# Patient Record
Sex: FEMALE | Race: ASIAN | Employment: OTHER | URBAN - METROPOLITAN AREA
[De-identification: names, ages, dates, MRNs, and addresses within clinical notes are randomized per-mention and may not be internally consistent; named-entity substitution may affect disease eponyms.]

---

## 2020-01-08 ENCOUNTER — HOSPITAL ENCOUNTER (EMERGENCY)
Age: 80
Discharge: HOME | End: 2020-01-08
Attending: EMERGENCY MEDICINE
Payer: MEDICARE

## 2020-01-08 VITALS
WEIGHT: 117 LBS | HEART RATE: 68 BPM | DIASTOLIC BLOOD PRESSURE: 68 MMHG | HEIGHT: 59 IN | BODY MASS INDEX: 23.59 KG/M2 | OXYGEN SATURATION: 98 % | TEMPERATURE: 98.3 F | RESPIRATION RATE: 16 BRPM | SYSTOLIC BLOOD PRESSURE: 141 MMHG

## 2020-01-08 DIAGNOSIS — R42 LIGHTHEADEDNESS: Primary | ICD-10-CM

## 2020-01-08 LAB
ALBUMIN SERPL-MCNC: 4.1 G/DL (ref 3.2–4.6)
ALBUMIN/GLOB SERPL: 1 {RATIO} (ref 1.2–3.5)
ALP SERPL-CCNC: 65 U/L (ref 50–136)
ALT SERPL-CCNC: 25 U/L (ref 12–65)
ANION GAP SERPL CALC-SCNC: 7 MMOL/L (ref 7–16)
AST SERPL-CCNC: 24 U/L (ref 15–37)
ATRIAL RATE: 84 BPM
BASOPHILS # BLD: 0 K/UL (ref 0–0.2)
BASOPHILS NFR BLD: 1 % (ref 0–2)
BILIRUB SERPL-MCNC: 0.3 MG/DL (ref 0.2–1.1)
BUN SERPL-MCNC: 12 MG/DL (ref 8–23)
CALCIUM SERPL-MCNC: 9.4 MG/DL (ref 8.3–10.4)
CALCULATED P AXIS, ECG09: 33 DEGREES
CALCULATED R AXIS, ECG10: -16 DEGREES
CALCULATED T AXIS, ECG11: 34 DEGREES
CHLORIDE SERPL-SCNC: 108 MMOL/L (ref 98–107)
CO2 SERPL-SCNC: 26 MMOL/L (ref 21–32)
CREAT SERPL-MCNC: 0.8 MG/DL (ref 0.6–1)
DIAGNOSIS, 93000: NORMAL
DIFFERENTIAL METHOD BLD: NORMAL
EOSINOPHIL # BLD: 0.1 K/UL (ref 0–0.8)
EOSINOPHIL NFR BLD: 2 % (ref 0.5–7.8)
ERYTHROCYTE [DISTWIDTH] IN BLOOD BY AUTOMATED COUNT: 12.8 % (ref 11.9–14.6)
GLOBULIN SER CALC-MCNC: 4.2 G/DL (ref 2.3–3.5)
GLUCOSE SERPL-MCNC: 102 MG/DL (ref 65–100)
HCT VFR BLD AUTO: 41.1 % (ref 35.8–46.3)
HGB BLD-MCNC: 14.2 G/DL (ref 11.7–15.4)
IMM GRANULOCYTES # BLD AUTO: 0 K/UL (ref 0–0.5)
IMM GRANULOCYTES NFR BLD AUTO: 0 % (ref 0–5)
LYMPHOCYTES # BLD: 3 K/UL (ref 0.5–4.6)
LYMPHOCYTES NFR BLD: 44 % (ref 13–44)
MCH RBC QN AUTO: 31.3 PG (ref 26.1–32.9)
MCHC RBC AUTO-ENTMCNC: 34.5 G/DL (ref 31.4–35)
MCV RBC AUTO: 90.7 FL (ref 79.6–97.8)
MONOCYTES # BLD: 0.3 K/UL (ref 0.1–1.3)
MONOCYTES NFR BLD: 5 % (ref 4–12)
NEUTS SEG # BLD: 3.3 K/UL (ref 1.7–8.2)
NEUTS SEG NFR BLD: 49 % (ref 43–78)
NRBC # BLD: 0 K/UL (ref 0–0.2)
P-R INTERVAL, ECG05: 198 MS
PLATELET # BLD AUTO: 191 K/UL (ref 150–450)
PMV BLD AUTO: 10 FL (ref 9.4–12.3)
POTASSIUM SERPL-SCNC: 3.6 MMOL/L (ref 3.5–5.1)
PROT SERPL-MCNC: 8.3 G/DL (ref 6.3–8.2)
Q-T INTERVAL, ECG07: 404 MS
QRS DURATION, ECG06: 90 MS
QTC CALCULATION (BEZET), ECG08: 477 MS
RBC # BLD AUTO: 4.53 M/UL (ref 4.05–5.2)
SODIUM SERPL-SCNC: 141 MMOL/L (ref 136–145)
TROPONIN I SERPL-MCNC: 0.02 NG/ML (ref 0.02–0.05)
VENTRICULAR RATE, ECG03: 84 BPM
WBC # BLD AUTO: 6.8 K/UL (ref 4.3–11.1)

## 2020-01-08 PROCEDURE — 85025 COMPLETE CBC W/AUTO DIFF WBC: CPT

## 2020-01-08 PROCEDURE — 99285 EMERGENCY DEPT VISIT HI MDM: CPT | Performed by: EMERGENCY MEDICINE

## 2020-01-08 PROCEDURE — 93005 ELECTROCARDIOGRAM TRACING: CPT | Performed by: EMERGENCY MEDICINE

## 2020-01-08 PROCEDURE — 74011250636 HC RX REV CODE- 250/636: Performed by: EMERGENCY MEDICINE

## 2020-01-08 PROCEDURE — 80053 COMPREHEN METABOLIC PANEL: CPT

## 2020-01-08 PROCEDURE — 84484 ASSAY OF TROPONIN QUANT: CPT

## 2020-01-08 RX ORDER — CALC/MAG/B COMPLEX/D3/HERB 61
TABLET ORAL
COMMUNITY

## 2020-01-08 RX ORDER — AMLODIPINE BESYLATE 10 MG/1
TABLET ORAL DAILY
COMMUNITY

## 2020-01-08 RX ADMIN — SODIUM CHLORIDE 1000 ML: 900 INJECTION, SOLUTION INTRAVENOUS at 16:45

## 2020-01-08 NOTE — ED TRIAGE NOTES
Pt states she has been feeling weak since this morning and like she is going to pass out. States she checked BP at [facility] and it was elevated but it is normal in triage. States she has had some nausea but denies vomiting. States she has been smelling some funny smells today also.

## 2020-01-08 NOTE — ED PROVIDER NOTES
Eunice Hernandes is a 67 y.o. female seen on 1/8/2020 at 3:45 PM in the [facility] EMERGENCY DEPT in room ER06/06. Chief Complaint   Patient presents with    Dizziness     HPI:  66 yo female presenting to ER for dizziness. She states her symptoms began at 1:30 pm. She was upstairs and smelled a funny smell and immediately felt lightheaded, like she might pass out. No sweating, no CP, no SOB, no slurred speech, weakness or numbness. She did not have vision change or N/V. She states when she sat down her symptoms resolved completely, but when she stood back up her symptoms returned again. This happened a total of three times. However she notes after triage in the ER, she was able to walk to her room without any symptoms and currently has no symptoms. She denies any  room spinning sensation. No syncope. Historian: patient    REVIEW OF SYSTEMS     Review of Systems   Constitutional: Negative for fever. HENT: Negative. Eyes: Negative. Respiratory: Negative for cough, chest tightness, shortness of breath and wheezing. Cardiovascular: Negative for chest pain. Gastrointestinal: Negative for abdominal distention, abdominal pain, constipation, diarrhea and vomiting. Endocrine: Negative. Genitourinary: Negative for dysuria, flank pain, frequency and urgency. Neurological: Positive for light-headedness. Negative for dizziness, syncope and headaches. Psychiatric/Behavioral: Negative. All other systems reviewed and are negative. PAST MEDICAL HISTORY     Past Medical History:   Diagnosis Date    Gastrointestinal disorder     Hypertension      History reviewed. No pertinent surgical history. Social History     Patient does not qualify to have social determinant information on file (likely too young).    Socioeconomic History    Marital status:      Spouse name: Not on file    Number of children: Not on file    Years of education: Not on file  Highest education level: Not on file     Prior to Admission Medications   Prescriptions Last Dose Informant Patient Reported? Taking? amLODIPine (NORVASC) 10 mg tablet   Yes Yes   Sig: Take  by mouth daily. lansoprazole (PREVACID) 15 mg capsule   Yes Yes   Sig: Take  by mouth Daily (before breakfast). Facility-Administered Medications: None     No Known Allergies     PHYSICAL EXAM       Vitals:    01/08/20 1444   BP: 139/78   Pulse: 84   Resp: 16   Temp: 98 °F (36.7 °C)   SpO2: 94%    Vital signs were reviewed. Physical Exam  Vitals signs reviewed. Constitutional:       General: She is not in acute distress. Appearance: She is well-developed. She is not diaphoretic. HENT:      Head: Normocephalic and atraumatic. Eyes:      General: No visual field deficit. Pupils: Pupils are equal, round, and reactive to light. Neck:      Musculoskeletal: Normal range of motion and neck supple. Cardiovascular:      Rate and Rhythm: Normal rate and regular rhythm. Heart sounds: Normal heart sounds. No murmur. No friction rub. No gallop. Pulmonary:      Effort: Pulmonary effort is normal. No respiratory distress. Breath sounds: Normal breath sounds. No stridor. No wheezing. Abdominal:      General: Bowel sounds are normal. There is no distension. Palpations: Abdomen is soft. There is no mass. Tenderness: There is no tenderness. There is no guarding or rebound. Musculoskeletal: Normal range of motion. General: No tenderness or deformity. Skin:     General: Skin is warm and dry. Findings: No erythema or rash. Neurological:      Mental Status: She is alert and oriented to person, place, and time. GCS: GCS eye subscore is 4. GCS verbal subscore is 5. GCS motor subscore is 6. Cranial Nerves: Cranial nerves are intact. No cranial nerve deficit, dysarthria or facial asymmetry. Sensory: Sensation is intact. No sensory deficit.       Motor: Motor function is intact. No weakness. Coordination: Coordination is intact. Finger-Nose-Finger Test normal.      Gait: Gait is intact. Gait normal.      Comments: No focal neuro deficits   Psychiatric:         Behavior: Behavior normal.          MEDICAL DECISION MAKING     ED Course: Labs are unremarkable, vital signs are stable. Static vital signs are negative. The patient remains asymptomatic. Her neurological examination is reassuring have low suspicion for stroke or other acute neurological cause as a source of her symptoms. I do not think that any neurological or other imaging is indicated at this time. The patient feels comfortable going home. It is possible this could be due to orthostasis or vasovagal changes. She has no nystagmus, no symptoms that appear consistent with vertigo. I have asked him to follow-up with her primary care doctor on Friday for reevaluation. However if her symptoms return she becomes dizzy again she should return to the emergency department immediately    Disposition:  Dc to home  Diagnosis:  lightheadedness  ____________________________________________________________________  A portion of this note was generated using voice recognition dictation software. While the note has been reviewed for accuracy, please note certain words and phrases may not be transcribed as intended and some grammatical and/or typographical errors may be present.

## 2020-01-08 NOTE — ED NOTES
I have reviewed discharge instructions with the patient. The patient verbalized understanding. Patient left ED via Discharge Method: ambulatory to Home with self. Opportunity for questions and clarification provided. Patient given 0 scripts. To continue your aftercare when you leave the hospital, you may receive an automated call from our care team to check in on how you are doing. This is a free service and part of our promise to provide the best care and service to meet your aftercare needs.  If you have questions, or wish to unsubscribe from this service please call [phone]. Thank you for Choosing our [facility] Emergency Department.

## 2022-09-08 LAB
AVERAGE GLUCOSE: NORMAL
HBA1C MFR BLD: 6.1 %

## 2022-10-25 DIAGNOSIS — D69.6 THROMBOCYTOPENIA (HCC): Primary | ICD-10-CM

## 2022-10-26 NOTE — PROGRESS NOTES
New Patient Abstract    Reason for Referral: Thrombocytopenia, unspecified    Referring Provider: Morgan Bingham PA-C     Primary Care Provider: THOMAS Deal NP    Family History of Cancer/Hematologic Disorders: There is no family history of cancer or hematologic disorders documented. Presenting Symptoms: No relevant physical symptoms reported. Narrative with recent with Results/Procedures/Biopsies and Dates completed: Ms. Corinne Bruch is an 21-year-old  female with a history of tobacco use (former smoker), HTN, GERD, arthritis, osteoporosis, non-obstructive CAD, IBS, atrial fibrillation, insomnia, hiatal hernia repair, bladder repair, heart catheterization, hysterectomy, and orthopedic surgery. She was seen on 9/8/22 at [facility] for her annual wellness visit. Routine labs drawn the same day were significant for abs lymphs 3.3 and platelet count of 533. BMP, hepatic panel, iron, and ferritin were all within normal limits. CBC was repeated on 9/14/22 and an accurate platelet count could not be performed due to aggregation of platelets. Review of records indicates that patient's platelet count has been normal over the last several years ranging between 175 and 202. Ms. Corinne Bruch is now referred to [facility], per primary care, for hematology evaluation and treatment of thrombocytopenia.      CBC 9/8/22              CBC 9/14/22                    BMP 9/8/22          HEPATIC PANEL 9/8/22        IRON AND FERRITIN 9/8/22      Notes from Referring Provider: None    Other Pertinent Information:     01/25/2021 (COVID-19, PFIZER PURPLE top, DILUTE for use, (age 15 y+), 30mcg/0.3mL)  02/17/2021 (COVID-19, PFIZER PURPLE top, DILUTE for use, (age 15 y+), 30mcg/0.3mL)    Presented at Tumor Board: N/A

## 2022-10-27 ENCOUNTER — OFFICE VISIT (OUTPATIENT)
Dept: ONCOLOGY | Age: 82
End: 2022-10-27
Payer: MEDICARE

## 2022-10-27 ENCOUNTER — HOSPITAL ENCOUNTER (OUTPATIENT)
Dept: LAB | Age: 82
Discharge: HOME | End: 2022-10-30
Payer: MEDICARE

## 2022-10-27 VITALS
HEIGHT: 58 IN | DIASTOLIC BLOOD PRESSURE: 73 MMHG | HEART RATE: 71 BPM | BODY MASS INDEX: 22.92 KG/M2 | OXYGEN SATURATION: 97 % | SYSTOLIC BLOOD PRESSURE: 147 MMHG | RESPIRATION RATE: 14 BRPM | TEMPERATURE: 97.5 F | WEIGHT: 109.2 LBS

## 2022-10-27 DIAGNOSIS — D69.6 THROMBOCYTOPENIA (HCC): ICD-10-CM

## 2022-10-27 DIAGNOSIS — D69.6 THROMBOCYTOPENIA (HCC): Primary | ICD-10-CM

## 2022-10-27 LAB
ALBUMIN SERPL-MCNC: 4.1 G/DL (ref 3.2–4.6)
ALBUMIN/GLOB SERPL: 1.1 {RATIO} (ref 0.4–1.6)
ALP SERPL-CCNC: 76 U/L (ref 50–136)
ALT SERPL-CCNC: 31 U/L (ref 12–65)
ANION GAP SERPL CALC-SCNC: 6 MMOL/L (ref 2–11)
AST SERPL-CCNC: 27 U/L (ref 15–37)
BASOPHILS # BLD: 0.1 K/UL (ref 0–0.2)
BASOPHILS NFR BLD: 1 % (ref 0–2)
BILIRUB SERPL-MCNC: 0.7 MG/DL (ref 0.2–1.1)
BUN SERPL-MCNC: 16 MG/DL (ref 8–23)
CALCIUM SERPL-MCNC: 9.2 MG/DL (ref 8.3–10.4)
CHLORIDE SERPL-SCNC: 111 MMOL/L (ref 101–110)
CO2 SERPL-SCNC: 24 MMOL/L (ref 21–32)
CREAT SERPL-MCNC: 0.8 MG/DL (ref 0.6–1)
DIFFERENTIAL METHOD BLD: ABNORMAL
EOSINOPHIL # BLD: 0.3 K/UL (ref 0–0.8)
EOSINOPHIL NFR BLD: 4 % (ref 0.5–7.8)
ERYTHROCYTE [DISTWIDTH] IN BLOOD BY AUTOMATED COUNT: 12.7 % (ref 11.9–14.6)
GLOBULIN SER CALC-MCNC: 3.9 G/DL (ref 2.8–4.5)
GLUCOSE SERPL-MCNC: 74 MG/DL (ref 65–100)
HCT VFR BLD AUTO: 40.7 % (ref 35.8–46.3)
HGB BLD-MCNC: 14 G/DL (ref 11.7–15.4)
IMM GRANULOCYTES # BLD AUTO: 0 K/UL (ref 0–0.5)
IMM GRANULOCYTES NFR BLD AUTO: 0 % (ref 0–5)
LYMPHOCYTES # BLD: 3.5 K/UL (ref 0.5–4.6)
LYMPHOCYTES NFR BLD: 51 % (ref 13–44)
MCH RBC QN AUTO: 31.3 PG (ref 26.1–32.9)
MCHC RBC AUTO-ENTMCNC: 34.4 G/DL (ref 31.4–35)
MCV RBC AUTO: 91.1 FL (ref 82–102)
MONOCYTES # BLD: 0.5 K/UL (ref 0.1–1.3)
MONOCYTES NFR BLD: 7 % (ref 4–12)
NEUTS SEG # BLD: 2.5 K/UL (ref 1.7–8.2)
NEUTS SEG NFR BLD: 37 % (ref 43–78)
NRBC # BLD: 0 K/UL (ref 0–0.2)
PLATELET # BLD AUTO: 163 K/UL (ref 150–450)
PLATELET # BLD AUTO: 184 K/UL (ref 150–450)
PMV BLD AUTO: 9.9 FL (ref 9.4–12.3)
POTASSIUM SERPL-SCNC: 3.6 MMOL/L (ref 3.5–5.1)
PROT SERPL-MCNC: 8 G/DL (ref 6.3–8.2)
RBC # BLD AUTO: 4.47 M/UL (ref 4.05–5.2)
SODIUM SERPL-SCNC: 141 MMOL/L (ref 133–143)
WBC # BLD AUTO: 6.8 K/UL (ref 4.3–11.1)

## 2022-10-27 PROCEDURE — 85025 COMPLETE CBC W/AUTO DIFF WBC: CPT

## 2022-10-27 PROCEDURE — 1123F ACP DISCUSS/DSCN MKR DOCD: CPT | Performed by: INTERNAL MEDICINE

## 2022-10-27 PROCEDURE — 85049 AUTOMATED PLATELET COUNT: CPT

## 2022-10-27 PROCEDURE — G8420 CALC BMI NORM PARAMETERS: HCPCS | Performed by: INTERNAL MEDICINE

## 2022-10-27 PROCEDURE — G8427 DOCREV CUR MEDS BY ELIG CLIN: HCPCS | Performed by: INTERNAL MEDICINE

## 2022-10-27 PROCEDURE — 99204 OFFICE O/P NEW MOD 45 MIN: CPT | Performed by: INTERNAL MEDICINE

## 2022-10-27 PROCEDURE — 80053 COMPREHEN METABOLIC PANEL: CPT

## 2022-10-27 PROCEDURE — G8400 PT W/DXA NO RESULTS DOC: HCPCS | Performed by: INTERNAL MEDICINE

## 2022-10-27 PROCEDURE — 1090F PRES/ABSN URINE INCON ASSESS: CPT | Performed by: INTERNAL MEDICINE

## 2022-10-27 PROCEDURE — 1036F TOBACCO NON-USER: CPT | Performed by: INTERNAL MEDICINE

## 2022-10-27 PROCEDURE — 36415 COLL VENOUS BLD VENIPUNCTURE: CPT

## 2022-10-27 PROCEDURE — G8484 FLU IMMUNIZE NO ADMIN: HCPCS | Performed by: INTERNAL MEDICINE

## 2022-10-27 RX ORDER — TRAMADOL HYDROCHLORIDE 50 MG/1
TABLET ORAL
COMMUNITY
Start: 2022-09-09

## 2022-10-27 RX ORDER — AMOXICILLIN 500 MG
CAPSULE ORAL
COMMUNITY

## 2022-10-27 RX ORDER — OXYCODONE HYDROCHLORIDE 5 MG/1
TABLET ORAL
COMMUNITY
Start: 2022-09-09

## 2022-10-27 ASSESSMENT — PATIENT HEALTH QUESTIONNAIRE - PHQ9
SUM OF ALL RESPONSES TO PHQ QUESTIONS 1-9: 0
1. LITTLE INTEREST OR PLEASURE IN DOING THINGS: 0
SUM OF ALL RESPONSES TO PHQ9 QUESTIONS 1 & 2: 0
2. FEELING DOWN, DEPRESSED OR HOPELESS: 0

## 2022-10-27 NOTE — PROGRESS NOTES
[facility]: Office Visit New Patient H/P    Chief Complaint: Thrombocytopenia     History of Present Illness:  Reason for Referral: Thrombocytopenia, unspecified    Referring Provider: Kassy Fletcher PA-C     Primary Care Provider: THOMAS Kang NP    Family History of Cancer/Hematologic Disorders: There is no family history of cancer or hematologic disorders documented. Presenting Symptoms: No relevant physical symptoms reported. Ms. Blanca Fernandez is an 81 y.o.  female with a history of tobacco use (former smoker), HTN, GERD, arthritis, osteoporosis, non-obstructive CAD, IBS, atrial fibrillation, insomnia, hiatal hernia repair, bladder repair, heart catheterization, hysterectomy, and orthopedic surgery. She was seen on 9/8/22 at [facility] for her annual wellness visit. Routine labs drawn the same day were significant for abs lymphs 3.3 and platelet count of 570. BMP, hepatic panel, iron, and ferritin were all within normal limits. CBC was repeated on 9/14/22 and an accurate platelet count could not be performed due to aggregation of platelets. Review of records indicates that patient's platelet count has been normal over the last several years ranging between 175 and 202. Ms. Blanca Fernandez is now referred to [facility], per primary care, for hematology evaluation and treatment of thrombocytopenia. CBC 9/8/22              CBC 9/14/22                    BMP 9/8/22          HEPATIC PANEL 9/8/22        IRON AND FERRITIN 9/8/22      Notes from Referring Provider: None    Other Pertinent Information:     01/25/2021 (COVID-19, PFIZER PURPLE top, DILUTE for use, (age 15 y+), 30mcg/0.3mL)  02/17/2021 (COVID-19, PFIZER PURPLE top, DILUTE for use, (age 15 y+), 30mcg/0.3mL)    Review of Systems:  Constitutional Denies fever or chills. Denies weight loss or appetite changes. Denies fatigue. Denies anorexia.    HEENT Denies trauma, bluring vision, hearing loss, ear pain, nosebleeds, sore throat, neck pain and ear discharge. Skin Denies lesions or rashes. Lungs Denies shortness of breath, cough, sputum production or hemoptysis. Cardiovascular Denies chest pain, palpitations, orthopnea, claudication and leg swelling. Gastrointestinal Denies nausea, vomiting, bowel changes. Denies bloody or black stools. Denies abdominal pain.  Denies dysuria, frequency or hesitancy of urination   Neuro Denies headaches, visual changes or ataxia. Denies dizziness, tingling, tremors, sensory change, speech change, focal weakness and headaches. Hematology Denies nasal/gum bleeding, denies easy bruise   Endo Denies heat/cold intolerance, denies diabetes. MSK Denies back pain, swollen legs, myalgias and falls. Psychiatric/Behavioral Denies depression and substance abuse. The patient is not nervous/anxious. Allergies   Allergen Reactions    Amlodipine Swelling     Leg swelling     Metronidazole Rash     Past Medical History:   Diagnosis Date    Gastrointestinal disorder     Hypertension      History reviewed. No pertinent surgical history. History reviewed. No pertinent family history. Social History     Socioeconomic History    Marital status:       Spouse name: Not on file    Number of children: Not on file    Years of education: Not on file    Highest education level: Not on file   Occupational History    Not on file   Tobacco Use    Smoking status: Never    Smokeless tobacco: Never   Substance and Sexual Activity    Alcohol use: Yes    Drug use: Not on file    Sexual activity: Not on file   Other Topics Concern    Not on file   Social History Narrative    Not on file     Social Determinants of Health     Financial Resource Strain: Not on file   Food Insecurity: Not on file   Transportation Needs: Not on file   Physical Activity: Not on file   Stress: Not on file   Social Connections: Not on file   Intimate Partner Violence: Not on file   Housing Stability: Not on file     Current Outpatient Medications   Medication Sig Dispense Refill    traMADol (ULTRAM) 50 MG tablet       oxyCODONE (ROXICODONE) 5 MG immediate release tablet       Multiple Vitamins-Minerals (WOMENS 50+ ADVANCED PO) Take 1 tablet by mouth every morning      Calcium 200 MG TABS Take 1 tablet by mouth every morning      Alpha-D-Galactosidase (BEANO PO) Take by mouth daily      Doxylamine Succinate, Sleep, (UNISOM PO) Take by mouth at bedtime      Omega-3 Fatty Acids (FISH OIL) 1200 MG CAPS Take by mouth      apixaban (ELIQUIS) 2.5 MG TABS tablet Take 2.5 mg by mouth 2 times daily      aspirin 81 MG EC tablet Take 81 mg by mouth daily      atorvastatin (LIPITOR) 20 MG tablet Take 20 mg by mouth      famotidine (PEPCID) 20 MG tablet TAKE 1 TABLET BY MOUTH EVERY DAY AT BEDTIME AS NEEDED      hydrocortisone 2.5 % cream APPLY TOPICALLY TO THE AFFECTED AREA TWICE DAILY      lansoprazole (PREVACID) 15 MG delayed release capsule Take by mouth every morning (before breakfast)      metoprolol succinate (TOPROL XL) 25 MG extended release tablet Take 25 mg by mouth daily      amLODIPine (NORVASC) 10 MG tablet Take by mouth daily (Patient not taking: Reported on 10/27/2022)      loratadine (CLARITIN) 10 MG tablet Take 10 mg by mouth daily (Patient not taking: Reported on 10/27/2022)       No current facility-administered medications for this visit. OBJECTIVE:  BP (!) 147/73 (Site: Left Upper Arm, Position: Standing, Cuff Size: Medium Adult)   Pulse 71   Temp 97.5 °F (36.4 °C) (Oral)   Resp 14   Ht 4' 10\" (1.473 m)   Wt 109 lb 3.2 oz (49.5 kg)   SpO2 97%   BMI 22.82 kg/m²     Physical Exam:  Constitutional: Oriented to person, place, and time. Well-developed and well-nourished. HEENT: Normocephalic and atraumatic. Oropharynx is clear and moist.   Conjunctivae and EOM are normal. Pupils are equal, round, and reactive to light. No scleral icterus. Neck supple. No JVD present. No tracheal deviation present.  No thyromegaly mmol/L    CO2 24 21 - 32 mmol/L    Anion Gap 6 2 - 11 mmol/L    Glucose 74 65 - 100 mg/dL    BUN 16 8 - 23 MG/DL    Creatinine 0.80 0.6 - 1.0 MG/DL    Est, Glom Filt Rate >60 >60 ml/min/1.73m2    Calcium 9.2 8.3 - 10.4 MG/DL    Total Bilirubin 0.7 0.2 - 1.1 MG/DL    ALT 31 12 - 65 U/L    AST 27 15 - 37 U/L    Alk Phosphatase 76 50 - 136 U/L    Total Protein 8.0 6.3 - 8.2 g/dL    Albumin 4.1 3.2 - 4.6 g/dL    Globulin 3.9 2.8 - 4.5 g/dL    Albumin/Globulin Ratio 1.1 0.4 - 1.6     Platelet Count    Collection Time: 10/27/22  1:49 PM   Result Value Ref Range    Platelets 755 199 - 306 K/uL       Imaging:  No results found for this or any previous visit. ASSESSMENT/PLAN:   Diagnosis Orders   1. Thrombocytopenia ([address]          81 y.o. F consulted for thrombocytopenia presented to [facility] on 10/27/2022. She is doing rather well for her age, relocated to [place] from [place] 3 years ago, regular labs at PCP showed mild thrombocytopenia, presented to [facility] and repeat lab in office showed platelet normalized, discussed that the temporary fluctuation can be due to drug effect, viral infection, lab variation, nonetheless not clinically significant and recommends no further hematological work-up or intervention, continue to follow PCP and return as needed. All questions are answered to their satisfaction. They will call for further questions and concerns. ECOG PERFORMANCE STATUS - 0-Fully active, able to carry on all pre-disease performance without restriction. Pain - 0 - No pain/10. None/Minimal pain - not affecting QOL     Fatigue - No flowsheet data found. Distress - No flowsheet data found. Total time independently spent on today's visit was 45min.  This time included: face-to-face time evaluating the patient as well as additional non-face-to-face time spent on: Preparing to see the patient by obtaining and reviewing previous test results, records and medical history, Performing a medically appropriate history and exam and documenting relevant clinical information for this visit, Counseling and educating patient and family, Ordering tests, Communicating with other health care professionals and Referring patient to another health care provider. Elements of this note have been dictated via voice recognition software. Text and phrases may be limited by the accuracy and autoconversion of the software. The chart has been reviewed, but errors may still be present. Marii Molina M.D.   [address], [address] [place] [address]  Office : [phone]  Fax : [phone]

## 2022-10-27 NOTE — PATIENT INSTRUCTIONS
Patient Instructions from Today's Visit    Reason for Visit:  New Patient    Plan:  - your platelet counts were low, but they are normal today. Follow Up:  - you do not have to follow up with us unless you need us.      Recent Lab Results:  Hospital Outpatient Visit on 10/27/2022   Component Date Value Ref Range Status    WBC 10/27/2022 6.8  4.3 - 11.1 K/uL Final    RBC 10/27/2022 4.47  4.05 - 5.2 M/uL Final    Hemoglobin 10/27/2022 14.0  11.7 - 15.4 g/dL Final    Hematocrit 10/27/2022 40.7  35.8 - 46.3 % Final    MCV 10/27/2022 91.1  82.0 - 102.0 FL Final    MCH 10/27/2022 31.3  26.1 - 32.9 PG Final    MCHC 10/27/2022 34.4  31.4 - 35.0 g/dL Final    RDW 10/27/2022 12.7  11.9 - 14.6 % Final    Platelets 21/59/4993 184  150 - 450 K/uL Final    MPV 10/27/2022 9.9  9.4 - 12.3 FL Final    nRBC 10/27/2022 0.00  0.0 - 0.2 K/uL Final    **Note: Absolute NRBC parameter is now reported with Hemogram**    Seg Neutrophils 10/27/2022 37 (A)  43 - 78 % Final    Lymphocytes 10/27/2022 51 (A)  13 - 44 % Final    Monocytes 10/27/2022 7  4.0 - 12.0 % Final    Eosinophils % 10/27/2022 4  0.5 - 7.8 % Final    Basophils 10/27/2022 1  0.0 - 2.0 % Final    Immature Granulocytes 10/27/2022 0  0.0 - 5.0 % Final    Segs Absolute 10/27/2022 2.5  1.7 - 8.2 K/UL Final    Absolute Lymph # 10/27/2022 3.5  0.5 - 4.6 K/UL Final    Absolute Mono # 10/27/2022 0.5  0.1 - 1.3 K/UL Final    Absolute Eos # 10/27/2022 0.3  0.0 - 0.8 K/UL Final    Basophils Absolute 10/27/2022 0.1  0.0 - 0.2 K/UL Final    Absolute Immature Granulocyte 10/27/2022 0.0  0.0 - 0.5 K/UL Final    Differential Type 10/27/2022 AUTOMATED    Final    Sodium 10/27/2022 141  133 - 143 mmol/L Final    Potassium 10/27/2022 3.6  3.5 - 5.1 mmol/L Final    Chloride 10/27/2022 111 (A)  101 - 110 mmol/L Final    CO2 10/27/2022 24  21 - 32 mmol/L Final    Anion Gap 10/27/2022 6  2 - 11 mmol/L Final    Glucose 10/27/2022 74  65 - 100 mg/dL Final    BUN 10/27/2022 16  8 - 23 MG/DL Final Creatinine 10/27/2022 0.80  0.6 - 1.0 MG/DL Final    EstHermelinda Filt Rate 10/27/2022 >60  >60 ml/min/1.73m2 Final    Comment:      Pediatric calculator link: Evan.at. org/professionals/kdoqi/gfr_calculatorped       Effective Oct 3, 2022       These results are not intended for use in patients <25years of age. eGFR results are calculated without a race factor using  the 2021 CKD-EPI equation. Careful clinical correlation is recommended, particularly when comparing to results calculated using previous equations. The CKD-EPI equation is less accurate in patients with extremes of muscle mass, extra-renal metabolism of creatinine, excessive creatine ingestion, or following therapy that affects renal tubular secretion. Calcium 10/27/2022 9.2  8.3 - 10.4 MG/DL Final    Total Bilirubin 10/27/2022 0.7  0.2 - 1.1 MG/DL Final    ALT 10/27/2022 31  12 - 65 U/L Final    AST 10/27/2022 27  15 - 37 U/L Final    Alk Phosphatase 10/27/2022 76  50 - 136 U/L Final    Total Protein 10/27/2022 8.0  6.3 - 8.2 g/dL Final    Albumin 10/27/2022 4.1  3.2 - 4.6 g/dL Final    Globulin 10/27/2022 3.9  2.8 - 4.5 g/dL Final    Albumin/Globulin Ratio 10/27/2022 1.1  0.4 - 1.6   Final    Platelets 86/20/5039 163  150 - 450 K/uL Final    Performed on Sodium Citrate Tube       Treatment Summary has been discussed and given to patient: n/a        -------------------------------------------------------------------------------------------------------------------  Please call our office at [phone] if you have any  of the following symptoms:   Fever of 100.5 or greater  Chills  Shortness of breath  Swelling or pain in one leg    After office hours an answering service is available and will contact a provider for emergencies or if you are experiencing any of the above symptoms. Patient did express an interest in My Chart.   My Chart log in information explained on the after visit summary printout at the check-out arline.    Abeba Kang MA

## 2025-02-05 ENCOUNTER — APPOINTMENT (OUTPATIENT)
Dept: CT IMAGING | Age: 85
End: 2025-02-05
Payer: MEDICARE

## 2025-02-05 ENCOUNTER — HOSPITAL ENCOUNTER (EMERGENCY)
Age: 85
Discharge: HOME | End: 2025-02-06
Attending: STUDENT IN AN ORGANIZED HEALTH CARE EDUCATION/TRAINING PROGRAM
Payer: MEDICARE

## 2025-02-05 ENCOUNTER — APPOINTMENT (OUTPATIENT)
Dept: GENERAL RADIOLOGY | Age: 85
End: 2025-02-05
Payer: MEDICARE

## 2025-02-05 DIAGNOSIS — K85.10 GALLSTONE PANCREATITIS: Primary | ICD-10-CM

## 2025-02-05 PROBLEM — I50.22 CHRONIC HFREF (HEART FAILURE WITH REDUCED EJECTION FRACTION) (HCC): Status: ACTIVE | Noted: 2021-05-05

## 2025-02-05 PROBLEM — I48.0 PAF (PAROXYSMAL ATRIAL FIBRILLATION) (HCC): Status: ACTIVE | Noted: 2022-05-13

## 2025-02-05 PROBLEM — I10 ESSENTIAL HYPERTENSION: Status: ACTIVE | Noted: 2021-05-04

## 2025-02-05 LAB
ALBUMIN SERPL-MCNC: 4 G/DL (ref 3.2–4.6)
ALBUMIN/GLOB SERPL: 1.1 (ref 1–1.9)
ALP SERPL-CCNC: 72 U/L (ref 35–104)
ALT SERPL-CCNC: 33 U/L (ref 8–45)
ANION GAP SERPL CALC-SCNC: 12 MMOL/L (ref 7–16)
AST SERPL-CCNC: 61 U/L (ref 15–37)
BASOPHILS # BLD: 0.05 K/UL (ref 0–0.2)
BASOPHILS NFR BLD: 0.8 % (ref 0–2)
BILIRUB SERPL-MCNC: 0.6 MG/DL (ref 0–1.2)
BUN SERPL-MCNC: 15 MG/DL (ref 8–23)
CALCIUM SERPL-MCNC: 9.4 MG/DL (ref 8.8–10.2)
CHLORIDE SERPL-SCNC: 104 MMOL/L (ref 98–107)
CO2 SERPL-SCNC: 24 MMOL/L (ref 20–29)
CREAT SERPL-MCNC: 0.8 MG/DL (ref 0.6–1.1)
DIFFERENTIAL METHOD BLD: NORMAL
EOSINOPHIL # BLD: 0.22 K/UL (ref 0–0.8)
EOSINOPHIL NFR BLD: 3.6 % (ref 0.5–7.8)
ERYTHROCYTE [DISTWIDTH] IN BLOOD BY AUTOMATED COUNT: 13.2 % (ref 11.9–14.6)
GLOBULIN SER CALC-MCNC: 3.5 G/DL (ref 2.3–3.5)
GLUCOSE SERPL-MCNC: 113 MG/DL (ref 70–99)
HCT VFR BLD AUTO: 41.4 % (ref 35.8–46.3)
HGB BLD-MCNC: 13.8 G/DL (ref 11.7–15.4)
IMM GRANULOCYTES # BLD AUTO: 0.01 K/UL (ref 0–0.5)
IMM GRANULOCYTES NFR BLD AUTO: 0.2 % (ref 0–5)
LIPASE SERPL-CCNC: 1250 U/L (ref 13–60)
LYMPHOCYTES # BLD: 2.34 K/UL (ref 0.5–4.6)
LYMPHOCYTES NFR BLD: 38.7 % (ref 13–44)
MCH RBC QN AUTO: 31.3 PG (ref 26.1–32.9)
MCHC RBC AUTO-ENTMCNC: 33.3 G/DL (ref 31.4–35)
MCV RBC AUTO: 93.9 FL (ref 82–102)
MONOCYTES # BLD: 0.4 K/UL (ref 0.1–1.3)
MONOCYTES NFR BLD: 6.6 % (ref 4–12)
NEUTS SEG # BLD: 3.02 K/UL (ref 1.7–8.2)
NEUTS SEG NFR BLD: 50.1 % (ref 43–78)
NRBC # BLD: 0 K/UL (ref 0–0.2)
NT PRO BNP: 372 PG/ML (ref 0–450)
PLATELET # BLD AUTO: 176 K/UL (ref 150–450)
PMV BLD AUTO: 10.1 FL (ref 9.4–12.3)
POTASSIUM SERPL-SCNC: 4.1 MMOL/L (ref 3.5–5.1)
PROT SERPL-MCNC: 7.4 G/DL (ref 6.3–8.2)
RBC # BLD AUTO: 4.41 M/UL (ref 4.05–5.2)
SODIUM SERPL-SCNC: 140 MMOL/L (ref 136–145)
TROPONIN T SERPL HS-MCNC: 18 NG/L (ref 0–14)
TROPONIN T SERPL HS-MCNC: 18 NG/L (ref 0–14)
WBC # BLD AUTO: 6 K/UL (ref 4.3–11.1)

## 2025-02-05 PROCEDURE — 85025 COMPLETE CBC W/AUTO DIFF WBC: CPT

## 2025-02-05 PROCEDURE — 2580000003 HC RX 258: Performed by: STUDENT IN AN ORGANIZED HEALTH CARE EDUCATION/TRAINING PROGRAM

## 2025-02-05 PROCEDURE — 96365 THER/PROPH/DIAG IV INF INIT: CPT

## 2025-02-05 PROCEDURE — 93005 ELECTROCARDIOGRAM TRACING: CPT | Performed by: STUDENT IN AN ORGANIZED HEALTH CARE EDUCATION/TRAINING PROGRAM

## 2025-02-05 PROCEDURE — 99285 EMERGENCY DEPT VISIT HI MDM: CPT

## 2025-02-05 PROCEDURE — 80053 COMPREHEN METABOLIC PANEL: CPT

## 2025-02-05 PROCEDURE — 71046 X-RAY EXAM CHEST 2 VIEWS: CPT

## 2025-02-05 PROCEDURE — 6360000004 HC RX CONTRAST MEDICATION: Performed by: STUDENT IN AN ORGANIZED HEALTH CARE EDUCATION/TRAINING PROGRAM

## 2025-02-05 PROCEDURE — 6360000002 HC RX W HCPCS: Performed by: STUDENT IN AN ORGANIZED HEALTH CARE EDUCATION/TRAINING PROGRAM

## 2025-02-05 PROCEDURE — 74177 CT ABD & PELVIS W/CONTRAST: CPT

## 2025-02-05 PROCEDURE — 83690 ASSAY OF LIPASE: CPT

## 2025-02-05 PROCEDURE — 83880 ASSAY OF NATRIURETIC PEPTIDE: CPT

## 2025-02-05 PROCEDURE — 84484 ASSAY OF TROPONIN QUANT: CPT

## 2025-02-05 PROCEDURE — 6370000000 HC RX 637 (ALT 250 FOR IP): Performed by: STUDENT IN AN ORGANIZED HEALTH CARE EDUCATION/TRAINING PROGRAM

## 2025-02-05 RX ORDER — IOPAMIDOL 755 MG/ML
100 INJECTION, SOLUTION INTRAVASCULAR
Status: COMPLETED | OUTPATIENT
Start: 2025-02-05 | End: 2025-02-05

## 2025-02-05 RX ADMIN — NITROGLYCERIN 1 INCH: 20 OINTMENT TOPICAL at 17:15

## 2025-02-05 RX ADMIN — IOPAMIDOL 100 ML: 755 INJECTION, SOLUTION INTRAVENOUS at 20:33

## 2025-02-05 RX ADMIN — PIPERACILLIN AND TAZOBACTAM 4500 MG: 4; .5 INJECTION, POWDER, LYOPHILIZED, FOR SOLUTION INTRAVENOUS at 22:34

## 2025-02-05 ASSESSMENT — ENCOUNTER SYMPTOMS
ABDOMINAL PAIN: 0
VOMITING: 0
EYE DISCHARGE: 0
WHEEZING: 0
SHORTNESS OF BREATH: 1
EYE PAIN: 0
NAUSEA: 0
SORE THROAT: 0

## 2025-02-05 ASSESSMENT — PAIN - FUNCTIONAL ASSESSMENT: PAIN_FUNCTIONAL_ASSESSMENT: 0-10

## 2025-02-05 ASSESSMENT — PAIN SCALES - GENERAL
PAINLEVEL_OUTOF10: 0
PAINLEVEL_OUTOF10: 4

## 2025-02-05 ASSESSMENT — LIFESTYLE VARIABLES
HOW OFTEN DO YOU HAVE A DRINK CONTAINING ALCOHOL: MONTHLY OR LESS
HOW MANY STANDARD DRINKS CONTAINING ALCOHOL DO YOU HAVE ON A TYPICAL DAY: 1 OR 2

## 2025-02-05 NOTE — ED TRIAGE NOTES
Pt w/c to triage with c/o mid chest pain radiating to her back she states he gotten progressively worse in the last two hours with HTN. Pt reports taking 3 ASA prior to arrival. Pt reports she was sent from her PCP.    Subjective:       Patient ID: Roosevelt Alejandro is a 75 y.o. male.    Chief Complaint: Employment Physical    HPI   Mr. Alejandro is a 74 yo male who presents today for physical and follow up.  His medical diagnosis are listed in the problem list below.  Most recent labs reviewed and below. He reports compliance with medications and denies side effects.  His fasting blood glucose is elevated, and has been previously.  His kidney function is also decreased.  He needs physical form completed for his employer.            Lab Results   Component Value Date    WBC 10.10 02/24/2019    HGB 13.2 (L) 02/24/2019    HCT 39.0 (L) 02/24/2019     02/24/2019    CHOL 146 05/28/2019    TRIG 156 (H) 05/28/2019    HDL 36 (L) 05/28/2019    ALT 18 05/28/2019    AST 18 05/28/2019     05/28/2019    K 3.9 05/28/2019     05/28/2019    CREATININE 1.6 (H) 05/28/2019    BUN 25 (H) 05/28/2019    CO2 25 05/28/2019    TSH 1.245 02/24/2019    PSA 28.6 (H) 12/20/2017    INR 1.1 02/24/2019    GLUF 109 07/22/2004    HGBA1C 5.9 01/07/2011           Patient Active Problem List   Diagnosis    Hypertension    Essential hypertension    DDD (degenerative disc disease), lumbar    Chronic allergic rhinitis    ACE inhibitor-aggravated angioedema    Pulmonary asbestosis    H/O arteriovenous malformation (AVM)    Frequent unifocal PVCs    Hx of colonic polyps    Vertigo    Vertigo, aural, unspecified laterality       Review of Systems   Constitutional: Negative for chills, fatigue, fever and unexpected weight change.   HENT: Negative for congestion, hearing loss, nosebleeds, postnasal drip, sinus pressure, sinus pain and sore throat.    Eyes: Negative for pain, discharge, redness and visual disturbance.   Respiratory: Negative for cough, chest tightness and shortness of breath.    Cardiovascular: Negative for chest pain and palpitations.   Gastrointestinal: Negative for abdominal pain, constipation, diarrhea, nausea and vomiting.    Endocrine: Negative for cold intolerance and heat intolerance.   Musculoskeletal: Negative for back pain.   Neurological: Negative for dizziness, syncope, light-headedness and headaches.   Psychiatric/Behavioral: Negative for agitation and dysphoric mood. The patient is not nervous/anxious.        Objective:      Physical Exam   Constitutional: He is oriented to person, place, and time. He appears well-developed and well-nourished.   HENT:   Head: Normocephalic and atraumatic.   Right Ear: Hearing and tympanic membrane normal.   Left Ear: Hearing and tympanic membrane normal.   Nose: Nose normal. Right sinus exhibits no maxillary sinus tenderness and no frontal sinus tenderness. Left sinus exhibits no maxillary sinus tenderness and no frontal sinus tenderness.   Mouth/Throat: Uvula is midline, oropharynx is clear and moist and mucous membranes are normal.   Eyes: Pupils are equal, round, and reactive to light. Conjunctivae are normal. Right eye exhibits no discharge. Left eye exhibits no discharge.   Neck: Normal range of motion. Neck supple. No thyromegaly present.   Cardiovascular: Normal rate, regular rhythm, S1 normal, S2 normal, normal heart sounds and intact distal pulses.   Pulmonary/Chest: Breath sounds normal. No respiratory distress. He has no wheezes.   Abdominal: Soft. Bowel sounds are normal. He exhibits no distension. There is no tenderness. There is no rebound.   Musculoskeletal: Normal range of motion. He exhibits no edema or deformity.   Lymphadenopathy:     He has no cervical adenopathy.   Neurological: He is alert and oriented to person, place, and time. He has normal strength. No cranial nerve deficit or sensory deficit.   Skin: Skin is warm and dry. No rash noted.   Psychiatric: He has a normal mood and affect.       Assessment:       1. Essential hypertension    2. Elevated fasting glucose    3. Abnormal kidney function study        Plan:       Essential hypertension  -     Comprehensive  metabolic panel; Future; Expected date: 06/30/2019        -     Mildly elevated today, 140/78        -    Instructed patient to notify provider if blood pressures are consistently greater than 140/90  Elevated fasting glucose  -     Hemoglobin A1c; Future; Expected date: 05/30/2019        -     Discussed with patient need for above study in regards to elevated fasting blood glucose level        -     Patient to return on another day to complete, due to his need to return to work   Abnormal kidney function study  -Repeat CMP in one month   -Increase fluid intake, and reduce use of NSAIDs      Patient readiness: acceptance and barriers:none    During the course of the visit the patient was educated and counseled about the following:     Hypertension:   Medication: no change.    Goals: Hypertension: Reduce Blood Pressure    Did patient meet goals/outcomes: Yes    The following self management tools provided: declined    Patient Instructions (the written plan) was given to the patient/family.     Time spent with patient: 15 minutes    Barriers to medications present (no )    Adverse reactions to current medications (no)    Over the counter medications reviewed (Yes)

## 2025-02-05 NOTE — ED PROVIDER NOTES
Emergency Department Provider Note       PCP: Arron Carreno APRN - NP   Age: 84 y.o.   Sex: female     DISPOSITION Decision To Transfer 02/05/2025 10:25:32 PM    ICD-10-CM    1. Gallstone pancreatitis  K85.10           Medical Decision Making     84-year-old female presents to the ED for what she describes as chest pain.  Vital signs demonstrate hypertension, otherwise within normal limits.  EKG shows NSR, no ST elevation or lethal dysrhythmia.  High-sensitivity troponin repeat troponin both flat at 18.  Patient does have elevation of lipase at 1250.  Actually does not have significant abdominal pain, very mild epigastric tenderness on repeat examination.  Did obtain a CT of the abdomen/pelvis that shows distended gallbladder with wall thickening and pericholecystic inflammatory stranding concerning for acute cholecystitis, also has dilated CBD at 14 mm in diameter.  This along with the pancreatitis is concerning for gallstone pancreatitis.  Discussed with both GI and general surgery team, they are recommending hospitalist admission downtown and will consult for MRCP and further investigation.  Patient hemodynamically stable at time of transfer.     1 or more acute illnesses that pose a threat to life or bodily function.   Discussion with external consultants.  Shared medical decision making was utilized in creating the patients health plan today.  I independently ordered and reviewed each unique test.    I reviewed external records: ED visit note from a different ED.   I reviewed external records: provider visit note from PCP.     ED cardiac monitoring rhythm strip was ordered and interpreted:  sinus rhythm, no evidence of an arrhythmia  ST Segments:Normal ST segments - NO STEMI   Rate: 81  I interpreted the CT Scan as above.  ED provider's independent EKG interpretation as above  The patient was admitted and I have discussed patient management with the admitting provider.  The management of this patient was  14:54,  Junctional rhythm has replaced Sinus rhythm           CT ABDOMEN PELVIS W IV CONTRAST Additional Contrast? None   Final Result   1. Distended gallbladder with gallbladder wall thickening and pericholecystic   inflammatory stranding concerning for acute cholecystitis. Common bile duct is   dilated measuring 14 mm in diameter. Recommend further evaluation with   gallbladder ultrasound and MRCP.   2. Peripancreatic stranding adjacent to the pancreatic head and neck suggestive   of mild acute pancreatitis.   3. Additional findings as above.               Electronically signed by Hilton ARREDONDO CHEST (2 VW)   Final Result   No acute findings in the chest         Electronically signed by Bud Turner                   No results for input(s): \"COVID19\" in the last 72 hours.     Voice dictation software was used during the making of this note.  This software is not perfect and grammatical and other typographical errors may be present.  This note has not been completely proofread for errors.     Baltazar King MD  02/05/25 [id number]

## 2025-02-06 ENCOUNTER — APPOINTMENT (OUTPATIENT)
Dept: GENERAL RADIOLOGY | Age: 85
End: 2025-02-06
Attending: INTERNAL MEDICINE
Payer: MEDICARE

## 2025-02-06 ENCOUNTER — HOSPITAL ENCOUNTER (INPATIENT)
Age: 85
LOS: 1 days | Discharge: HOME | End: 2025-02-07
Attending: INTERNAL MEDICINE | Admitting: FAMILY MEDICINE
Payer: MEDICARE

## 2025-02-06 ENCOUNTER — ANESTHESIA EVENT (OUTPATIENT)
Dept: ENDOSCOPY | Age: 85
End: 2025-02-06
Payer: MEDICARE

## 2025-02-06 ENCOUNTER — ANESTHESIA (OUTPATIENT)
Dept: ENDOSCOPY | Age: 85
End: 2025-02-06
Payer: MEDICARE

## 2025-02-06 VITALS
WEIGHT: 117 LBS | BODY MASS INDEX: 24.56 KG/M2 | OXYGEN SATURATION: 93 % | HEIGHT: 58 IN | DIASTOLIC BLOOD PRESSURE: 77 MMHG | HEART RATE: 82 BPM | TEMPERATURE: 97.5 F | RESPIRATION RATE: 25 BRPM | SYSTOLIC BLOOD PRESSURE: 116 MMHG

## 2025-02-06 DIAGNOSIS — K83.8 COMMON BILE DUCT DILATATION: ICD-10-CM

## 2025-02-06 PROBLEM — K81.9 CHOLECYSTITIS: Status: ACTIVE | Noted: 2025-02-06

## 2025-02-06 PROBLEM — I51.89 DIASTOLIC DYSFUNCTION: Status: ACTIVE | Noted: 2025-02-06

## 2025-02-06 LAB
ALBUMIN SERPL-MCNC: 3.4 G/DL (ref 3.2–4.6)
ALBUMIN/GLOB SERPL: 1 (ref 1–1.9)
ALP SERPL-CCNC: 83 U/L (ref 35–104)
ALT SERPL-CCNC: 118 U/L (ref 8–45)
ANION GAP SERPL CALC-SCNC: 12 MMOL/L (ref 7–16)
AST SERPL-CCNC: 180 U/L (ref 15–37)
BASOPHILS # BLD: 0.04 K/UL (ref 0–0.2)
BASOPHILS NFR BLD: 0.5 % (ref 0–2)
BILIRUB SERPL-MCNC: 1.2 MG/DL (ref 0–1.2)
BUN SERPL-MCNC: 16 MG/DL (ref 8–23)
CALCIUM SERPL-MCNC: 8.9 MG/DL (ref 8.8–10.2)
CHLORIDE SERPL-SCNC: 107 MMOL/L (ref 98–107)
CO2 SERPL-SCNC: 21 MMOL/L (ref 20–29)
CREAT SERPL-MCNC: 0.81 MG/DL (ref 0.6–1.1)
DIFFERENTIAL METHOD BLD: ABNORMAL
EKG ATRIAL RATE: 357 BPM
EKG DIAGNOSIS: NORMAL
EKG Q-T INTERVAL: 430 MS
EKG QRS DURATION: 86 MS
EKG QTC CALCULATION (BAZETT): 460 MS
EKG R AXIS: -1 DEGREES
EKG T AXIS: 7 DEGREES
EKG VENTRICULAR RATE: 69 BPM
EOSINOPHIL # BLD: 0.05 K/UL (ref 0–0.8)
EOSINOPHIL NFR BLD: 0.6 % (ref 0.5–7.8)
ERYTHROCYTE [DISTWIDTH] IN BLOOD BY AUTOMATED COUNT: 13.2 % (ref 11.9–14.6)
GLOBULIN SER CALC-MCNC: 3.5 G/DL (ref 2.3–3.5)
GLUCOSE SERPL-MCNC: 119 MG/DL (ref 70–99)
HCT VFR BLD AUTO: 38.9 % (ref 35.8–46.3)
HGB BLD-MCNC: 13.5 G/DL (ref 11.7–15.4)
IMM GRANULOCYTES # BLD AUTO: 0.03 K/UL (ref 0–0.5)
IMM GRANULOCYTES NFR BLD AUTO: 0.4 % (ref 0–5)
LYMPHOCYTES # BLD: 1.17 K/UL (ref 0.5–4.6)
LYMPHOCYTES NFR BLD: 13.7 % (ref 13–44)
MCH RBC QN AUTO: 31.3 PG (ref 26.1–32.9)
MCHC RBC AUTO-ENTMCNC: 34.7 G/DL (ref 31.4–35)
MCV RBC AUTO: 90 FL (ref 82–102)
MONOCYTES # BLD: 0.49 K/UL (ref 0.1–1.3)
MONOCYTES NFR BLD: 5.8 % (ref 4–12)
NEUTS SEG # BLD: 6.74 K/UL (ref 1.7–8.2)
NEUTS SEG NFR BLD: 79 % (ref 43–78)
NRBC # BLD: 0 K/UL (ref 0–0.2)
PLATELET # BLD AUTO: 146 K/UL (ref 150–450)
PMV BLD AUTO: 9.9 FL (ref 9.4–12.3)
POTASSIUM SERPL-SCNC: 3.8 MMOL/L (ref 3.5–5.1)
PROT SERPL-MCNC: 6.9 G/DL (ref 6.3–8.2)
RBC # BLD AUTO: 4.32 M/UL (ref 4.05–5.2)
SODIUM SERPL-SCNC: 140 MMOL/L (ref 136–145)
WBC # BLD AUTO: 8.5 K/UL (ref 4.3–11.1)

## 2025-02-06 PROCEDURE — 6370000000 HC RX 637 (ALT 250 FOR IP): Performed by: INTERNAL MEDICINE

## 2025-02-06 PROCEDURE — 74328 X-RAY BILE DUCT ENDOSCOPY: CPT | Performed by: INTERNAL MEDICINE

## 2025-02-06 PROCEDURE — 6370000000 HC RX 637 (ALT 250 FOR IP): Performed by: FAMILY MEDICINE

## 2025-02-06 PROCEDURE — 93010 ELECTROCARDIOGRAM REPORT: CPT | Performed by: INTERNAL MEDICINE

## 2025-02-06 PROCEDURE — 6360000002 HC RX W HCPCS: Performed by: FAMILY MEDICINE

## 2025-02-06 PROCEDURE — 3609015100 HC ERCP STENT PLACEMENT BILIARY/PANCREATIC DUCT: Performed by: INTERNAL MEDICINE

## 2025-02-06 PROCEDURE — C1769 GUIDE WIRE: HCPCS | Performed by: INTERNAL MEDICINE

## 2025-02-06 PROCEDURE — 3700000001 HC ADD 15 MINUTES (ANESTHESIA): Performed by: INTERNAL MEDICINE

## 2025-02-06 PROCEDURE — 0F798DZ DILATION OF COMMON BILE DUCT WITH INTRALUMINAL DEVICE, VIA NATURAL OR ARTIFICIAL OPENING ENDOSCOPIC: ICD-10-PCS | Performed by: INTERNAL MEDICINE

## 2025-02-06 PROCEDURE — 36415 COLL VENOUS BLD VENIPUNCTURE: CPT

## 2025-02-06 PROCEDURE — 6360000002 HC RX W HCPCS: Performed by: ANESTHESIOLOGY

## 2025-02-06 PROCEDURE — 6360000002 HC RX W HCPCS

## 2025-02-06 PROCEDURE — 2500000003 HC RX 250 WO HCPCS: Performed by: FAMILY MEDICINE

## 2025-02-06 PROCEDURE — 80053 COMPREHEN METABOLIC PANEL: CPT

## 2025-02-06 PROCEDURE — 2580000003 HC RX 258: Performed by: INTERNAL MEDICINE

## 2025-02-06 PROCEDURE — 2720000010 HC SURG SUPPLY STERILE: Performed by: INTERNAL MEDICINE

## 2025-02-06 PROCEDURE — 2580000003 HC RX 258

## 2025-02-06 PROCEDURE — 3700000000 HC ANESTHESIA ATTENDED CARE: Performed by: INTERNAL MEDICINE

## 2025-02-06 PROCEDURE — 99222 1ST HOSP IP/OBS MODERATE 55: CPT | Performed by: SURGERY

## 2025-02-06 PROCEDURE — 2580000003 HC RX 258: Performed by: FAMILY MEDICINE

## 2025-02-06 PROCEDURE — C2625 STENT, NON-COR, TEM W/DEL SY: HCPCS | Performed by: INTERNAL MEDICINE

## 2025-02-06 PROCEDURE — 7100000000 HC PACU RECOVERY - FIRST 15 MIN: Performed by: INTERNAL MEDICINE

## 2025-02-06 PROCEDURE — 43274 ERCP DUCT STENT PLACEMENT: CPT | Performed by: INTERNAL MEDICINE

## 2025-02-06 PROCEDURE — 2709999900 HC NON-CHARGEABLE SUPPLY: Performed by: INTERNAL MEDICINE

## 2025-02-06 PROCEDURE — 1100000000 HC RM PRIVATE

## 2025-02-06 PROCEDURE — 85025 COMPLETE CBC W/AUTO DIFF WBC: CPT

## 2025-02-06 PROCEDURE — 7100000001 HC PACU RECOVERY - ADDTL 15 MIN: Performed by: INTERNAL MEDICINE

## 2025-02-06 DEVICE — BILIARY STENT WITH NAVIFLEXTM RX DELIVERY SYSTEM
Type: IMPLANTABLE DEVICE | Site: BILE DUCT | Status: FUNCTIONAL
Brand: ADVANIX™ BILIARY

## 2025-02-06 RX ORDER — SUCCINYLCHOLINE CHLORIDE 20 MG/ML
INJECTION INTRAMUSCULAR; INTRAVENOUS
Status: DISCONTINUED | OUTPATIENT
Start: 2025-02-06 | End: 2025-02-06 | Stop reason: SDUPTHER

## 2025-02-06 RX ORDER — FENTANYL CITRATE 50 UG/ML
INJECTION, SOLUTION INTRAMUSCULAR; INTRAVENOUS
Status: DISCONTINUED | OUTPATIENT
Start: 2025-02-06 | End: 2025-02-06 | Stop reason: SDUPTHER

## 2025-02-06 RX ORDER — ATORVASTATIN CALCIUM 20 MG/1
20 TABLET, FILM COATED ORAL NIGHTLY
Status: DISCONTINUED | OUTPATIENT
Start: 2025-02-06 | End: 2025-02-07 | Stop reason: HOSPADM

## 2025-02-06 RX ORDER — MORPHINE SULFATE 2 MG/ML
2 INJECTION, SOLUTION INTRAMUSCULAR; INTRAVENOUS EVERY 4 HOURS PRN
Status: DISCONTINUED | OUTPATIENT
Start: 2025-02-06 | End: 2025-02-07 | Stop reason: HOSPADM

## 2025-02-06 RX ORDER — MAGNESIUM SULFATE IN WATER 40 MG/ML
2000 INJECTION, SOLUTION INTRAVENOUS PRN
Status: DISCONTINUED | OUTPATIENT
Start: 2025-02-06 | End: 2025-02-07 | Stop reason: HOSPADM

## 2025-02-06 RX ORDER — SODIUM CHLORIDE, SODIUM LACTATE, POTASSIUM CHLORIDE, AND CALCIUM CHLORIDE .6; .31; .03; .02 G/100ML; G/100ML; G/100ML; G/100ML
1000 INJECTION, SOLUTION INTRAVENOUS ONCE
Status: COMPLETED | OUTPATIENT
Start: 2025-02-06 | End: 2025-02-06

## 2025-02-06 RX ORDER — NALOXONE HYDROCHLORIDE 0.4 MG/ML
INJECTION, SOLUTION INTRAMUSCULAR; INTRAVENOUS; SUBCUTANEOUS PRN
Status: DISCONTINUED | OUTPATIENT
Start: 2025-02-06 | End: 2025-02-06 | Stop reason: HOSPADM

## 2025-02-06 RX ORDER — OXYCODONE HYDROCHLORIDE 5 MG/1
10 TABLET ORAL EVERY 4 HOURS PRN
Status: DISCONTINUED | OUTPATIENT
Start: 2025-02-06 | End: 2025-02-07 | Stop reason: HOSPADM

## 2025-02-06 RX ORDER — INDOMETHACIN 100 MG
SUPPOSITORY, RECTAL RECTAL PRN
Status: DISCONTINUED | OUTPATIENT
Start: 2025-02-06 | End: 2025-02-06 | Stop reason: ALTCHOICE

## 2025-02-06 RX ORDER — SODIUM CHLORIDE 0.9 % (FLUSH) 0.9 %
5-40 SYRINGE (ML) INJECTION PRN
Status: DISCONTINUED | OUTPATIENT
Start: 2025-02-06 | End: 2025-02-06 | Stop reason: HOSPADM

## 2025-02-06 RX ORDER — SODIUM CHLORIDE 0.9 % (FLUSH) 0.9 %
5-40 SYRINGE (ML) INJECTION EVERY 12 HOURS SCHEDULED
Status: DISCONTINUED | OUTPATIENT
Start: 2025-02-06 | End: 2025-02-07 | Stop reason: HOSPADM

## 2025-02-06 RX ORDER — POTASSIUM CHLORIDE 7.45 MG/ML
10 INJECTION INTRAVENOUS PRN
Status: DISCONTINUED | OUTPATIENT
Start: 2025-02-06 | End: 2025-02-07 | Stop reason: HOSPADM

## 2025-02-06 RX ORDER — ACETAMINOPHEN 325 MG/1
650 TABLET ORAL EVERY 6 HOURS PRN
Status: DISCONTINUED | OUTPATIENT
Start: 2025-02-06 | End: 2025-02-07 | Stop reason: HOSPADM

## 2025-02-06 RX ORDER — ONDANSETRON 2 MG/ML
4 INJECTION INTRAMUSCULAR; INTRAVENOUS
Status: COMPLETED | OUTPATIENT
Start: 2025-02-06 | End: 2025-02-06

## 2025-02-06 RX ORDER — TRAMADOL HYDROCHLORIDE 50 MG/1
50 TABLET ORAL EVERY 6 HOURS PRN
Status: DISCONTINUED | OUTPATIENT
Start: 2025-02-06 | End: 2025-02-06

## 2025-02-06 RX ORDER — HALOPERIDOL 5 MG/ML
1 INJECTION INTRAMUSCULAR
Status: DISCONTINUED | OUTPATIENT
Start: 2025-02-06 | End: 2025-02-06 | Stop reason: HOSPADM

## 2025-02-06 RX ORDER — SODIUM CHLORIDE, SODIUM LACTATE, POTASSIUM CHLORIDE, CALCIUM CHLORIDE 600; 310; 30; 20 MG/100ML; MG/100ML; MG/100ML; MG/100ML
INJECTION, SOLUTION INTRAVENOUS
Status: DISCONTINUED | OUTPATIENT
Start: 2025-02-06 | End: 2025-02-06 | Stop reason: SDUPTHER

## 2025-02-06 RX ORDER — DEXAMETHASONE SODIUM PHOSPHATE 4 MG/ML
INJECTION, SOLUTION INTRA-ARTICULAR; INTRALESIONAL; INTRAMUSCULAR; INTRAVENOUS; SOFT TISSUE
Status: DISCONTINUED | OUTPATIENT
Start: 2025-02-06 | End: 2025-02-06 | Stop reason: SDUPTHER

## 2025-02-06 RX ORDER — SODIUM CHLORIDE 0.9 % (FLUSH) 0.9 %
5-40 SYRINGE (ML) INJECTION EVERY 12 HOURS SCHEDULED
Status: DISCONTINUED | OUTPATIENT
Start: 2025-02-06 | End: 2025-02-06 | Stop reason: HOSPADM

## 2025-02-06 RX ORDER — CETIRIZINE HYDROCHLORIDE 10 MG/1
10 TABLET ORAL DAILY
Status: DISCONTINUED | OUTPATIENT
Start: 2025-02-06 | End: 2025-02-06

## 2025-02-06 RX ORDER — DEXTROSE MONOHYDRATE 100 MG/ML
INJECTION, SOLUTION INTRAVENOUS CONTINUOUS PRN
Status: DISCONTINUED | OUTPATIENT
Start: 2025-02-06 | End: 2025-02-06 | Stop reason: HOSPADM

## 2025-02-06 RX ORDER — POLYETHYLENE GLYCOL 3350 17 G/17G
17 POWDER, FOR SOLUTION ORAL DAILY PRN
Status: DISCONTINUED | OUTPATIENT
Start: 2025-02-06 | End: 2025-02-07 | Stop reason: HOSPADM

## 2025-02-06 RX ORDER — POTASSIUM CHLORIDE 1500 MG/1
40 TABLET, EXTENDED RELEASE ORAL PRN
Status: DISCONTINUED | OUTPATIENT
Start: 2025-02-06 | End: 2025-02-07 | Stop reason: HOSPADM

## 2025-02-06 RX ORDER — SODIUM CHLORIDE, SODIUM LACTATE, POTASSIUM CHLORIDE, CALCIUM CHLORIDE 600; 310; 30; 20 MG/100ML; MG/100ML; MG/100ML; MG/100ML
INJECTION, SOLUTION INTRAVENOUS CONTINUOUS
Status: DISCONTINUED | OUTPATIENT
Start: 2025-02-06 | End: 2025-02-06 | Stop reason: HOSPADM

## 2025-02-06 RX ORDER — PANTOPRAZOLE SODIUM 40 MG/1
40 TABLET, DELAYED RELEASE ORAL
Status: DISCONTINUED | OUTPATIENT
Start: 2025-02-06 | End: 2025-02-07 | Stop reason: HOSPADM

## 2025-02-06 RX ORDER — ASPIRIN 81 MG/1
81 TABLET ORAL DAILY
Status: DISCONTINUED | OUTPATIENT
Start: 2025-02-06 | End: 2025-02-07 | Stop reason: HOSPADM

## 2025-02-06 RX ORDER — IBUPROFEN 600 MG/1
1 TABLET ORAL PRN
Status: DISCONTINUED | OUTPATIENT
Start: 2025-02-06 | End: 2025-02-06 | Stop reason: HOSPADM

## 2025-02-06 RX ORDER — ONDANSETRON 2 MG/ML
INJECTION INTRAMUSCULAR; INTRAVENOUS
Status: DISCONTINUED | OUTPATIENT
Start: 2025-02-06 | End: 2025-02-06 | Stop reason: SDUPTHER

## 2025-02-06 RX ORDER — PHENYLEPHRINE HYDROCHLORIDE 10 MG/ML
INJECTION INTRAVENOUS
Status: DISCONTINUED | OUTPATIENT
Start: 2025-02-06 | End: 2025-02-06 | Stop reason: SDUPTHER

## 2025-02-06 RX ORDER — ACETAMINOPHEN 650 MG/1
650 SUPPOSITORY RECTAL EVERY 6 HOURS PRN
Status: DISCONTINUED | OUTPATIENT
Start: 2025-02-06 | End: 2025-02-07 | Stop reason: HOSPADM

## 2025-02-06 RX ORDER — PROPOFOL 10 MG/ML
INJECTION, EMULSION INTRAVENOUS
Status: DISCONTINUED | OUTPATIENT
Start: 2025-02-06 | End: 2025-02-06 | Stop reason: SDUPTHER

## 2025-02-06 RX ORDER — SODIUM CHLORIDE 0.9 % (FLUSH) 0.9 %
5-40 SYRINGE (ML) INJECTION PRN
Status: DISCONTINUED | OUTPATIENT
Start: 2025-02-06 | End: 2025-02-07 | Stop reason: HOSPADM

## 2025-02-06 RX ORDER — METOPROLOL SUCCINATE 25 MG/1
25 TABLET, EXTENDED RELEASE ORAL DAILY
Status: DISCONTINUED | OUTPATIENT
Start: 2025-02-06 | End: 2025-02-07 | Stop reason: HOSPADM

## 2025-02-06 RX ORDER — SODIUM CHLORIDE 9 MG/ML
INJECTION, SOLUTION INTRAVENOUS PRN
Status: DISCONTINUED | OUTPATIENT
Start: 2025-02-06 | End: 2025-02-07 | Stop reason: HOSPADM

## 2025-02-06 RX ORDER — OXYCODONE HYDROCHLORIDE 5 MG/1
5 TABLET ORAL
Status: DISCONTINUED | OUTPATIENT
Start: 2025-02-06 | End: 2025-02-06 | Stop reason: HOSPADM

## 2025-02-06 RX ORDER — LIDOCAINE HYDROCHLORIDE 20 MG/ML
INJECTION, SOLUTION EPIDURAL; INFILTRATION; INTRACAUDAL; PERINEURAL
Status: DISCONTINUED | OUTPATIENT
Start: 2025-02-06 | End: 2025-02-06 | Stop reason: SDUPTHER

## 2025-02-06 RX ORDER — SODIUM CHLORIDE 9 MG/ML
INJECTION, SOLUTION INTRAVENOUS PRN
Status: DISCONTINUED | OUTPATIENT
Start: 2025-02-06 | End: 2025-02-06 | Stop reason: HOSPADM

## 2025-02-06 RX ORDER — ONDANSETRON 2 MG/ML
4 INJECTION INTRAMUSCULAR; INTRAVENOUS EVERY 6 HOURS PRN
Status: DISCONTINUED | OUTPATIENT
Start: 2025-02-06 | End: 2025-02-07 | Stop reason: HOSPADM

## 2025-02-06 RX ORDER — IBUPROFEN 600 MG/1
TABLET ORAL
Status: DISCONTINUED | OUTPATIENT
Start: 2025-02-06 | End: 2025-02-06 | Stop reason: SDUPTHER

## 2025-02-06 RX ORDER — ONDANSETRON 4 MG/1
4 TABLET, ORALLY DISINTEGRATING ORAL EVERY 8 HOURS PRN
Status: DISCONTINUED | OUTPATIENT
Start: 2025-02-06 | End: 2025-02-07 | Stop reason: HOSPADM

## 2025-02-06 RX ADMIN — METOPROLOL SUCCINATE 25 MG: 25 TABLET, EXTENDED RELEASE ORAL at 08:33

## 2025-02-06 RX ADMIN — PROPOFOL 50 MG: 10 INJECTION, EMULSION INTRAVENOUS at 14:30

## 2025-02-06 RX ADMIN — ATORVASTATIN CALCIUM 20 MG: 20 TABLET, FILM COATED ORAL at 21:56

## 2025-02-06 RX ADMIN — GLUCAGON 1 EACH: KIT at 14:35

## 2025-02-06 RX ADMIN — LIDOCAINE HYDROCHLORIDE 60 MG: 20 INJECTION, SOLUTION EPIDURAL; INFILTRATION; INTRACAUDAL; PERINEURAL at 14:13

## 2025-02-06 RX ADMIN — PHENYLEPHRINE HYDROCHLORIDE 200 MCG: 10 INJECTION INTRAVENOUS at 14:41

## 2025-02-06 RX ADMIN — PROPOFOL 130 MG: 10 INJECTION, EMULSION INTRAVENOUS at 14:13

## 2025-02-06 RX ADMIN — Medication 140 MG: at 14:13

## 2025-02-06 RX ADMIN — ASPIRIN 81 MG: 81 TABLET, COATED ORAL at 08:32

## 2025-02-06 RX ADMIN — SODIUM CHLORIDE, POTASSIUM CHLORIDE, SODIUM LACTATE AND CALCIUM CHLORIDE 1000 ML: 600; 310; 30; 20 INJECTION, SOLUTION INTRAVENOUS at 15:37

## 2025-02-06 RX ADMIN — SODIUM CHLORIDE, PRESERVATIVE FREE 10 ML: 5 INJECTION INTRAVENOUS at 08:31

## 2025-02-06 RX ADMIN — PANTOPRAZOLE SODIUM 40 MG: 40 TABLET, DELAYED RELEASE ORAL at 05:30

## 2025-02-06 RX ADMIN — DEXAMETHASONE SODIUM PHOSPHATE 4 MG: 4 INJECTION INTRA-ARTICULAR; INTRALESIONAL; INTRAMUSCULAR; INTRAVENOUS; SOFT TISSUE at 14:23

## 2025-02-06 RX ADMIN — ONDANSETRON 4 MG: 2 INJECTION, SOLUTION INTRAMUSCULAR; INTRAVENOUS at 14:23

## 2025-02-06 RX ADMIN — SODIUM CHLORIDE, SODIUM LACTATE, POTASSIUM CHLORIDE, AND CALCIUM CHLORIDE: 600; 310; 30; 20 INJECTION, SOLUTION INTRAVENOUS at 14:08

## 2025-02-06 RX ADMIN — PIPERACILLIN AND TAZOBACTAM 3375 MG: 3; .375 INJECTION, POWDER, LYOPHILIZED, FOR SOLUTION INTRAVENOUS at 05:33

## 2025-02-06 RX ADMIN — SODIUM CHLORIDE, PRESERVATIVE FREE 10 ML: 5 INJECTION INTRAVENOUS at 21:54

## 2025-02-06 RX ADMIN — ONDANSETRON 4 MG: 2 INJECTION, SOLUTION INTRAMUSCULAR; INTRAVENOUS at 15:16

## 2025-02-06 RX ADMIN — PIPERACILLIN AND TAZOBACTAM 3375 MG: 3; .375 INJECTION, POWDER, LYOPHILIZED, FOR SOLUTION INTRAVENOUS at 21:59

## 2025-02-06 RX ADMIN — SODIUM CHLORIDE, SODIUM LACTATE, POTASSIUM CHLORIDE, AND CALCIUM CHLORIDE: 600; 310; 30; 20 INJECTION, SOLUTION INTRAVENOUS at 15:03

## 2025-02-06 RX ADMIN — PHENYLEPHRINE HYDROCHLORIDE 100 MCG: 10 INJECTION INTRAVENOUS at 14:50

## 2025-02-06 RX ADMIN — FENTANYL CITRATE 25 MCG: 50 INJECTION, SOLUTION INTRAMUSCULAR; INTRAVENOUS at 14:50

## 2025-02-06 ASSESSMENT — ENCOUNTER SYMPTOMS
VOMITING: 0
NAUSEA: 0
ABDOMINAL PAIN: 0
DIARRHEA: 0

## 2025-02-06 ASSESSMENT — PAIN SCALES - GENERAL
PAINLEVEL_OUTOF10: 0

## 2025-02-06 ASSESSMENT — PAIN - FUNCTIONAL ASSESSMENT: PAIN_FUNCTIONAL_ASSESSMENT: 0-10

## 2025-02-06 NOTE — CONSULTS
H&P/Consult Note/Progress Note/Office Note:   Kortney Ruggiero  MRN: [id number]  :1940  Age:84 y.o.    HPI: Kortney Ruggiero is a 84 y.o. female who we are asked by IM to see for Gallstone Pancreatitis. The patient has a PMHx of HTN, CHF, PAF on Eliquis (has not taken since before yesterday). She presented with chest pain that was worked up with Cardiac etiology ruled out. She had been having chest pain for the past week. Of note, her son passed away last week and /services are scheduled for tomorrow.      Admission Lipase 1250 without elevation of T bili, mild trasaminitis and CT abdomen demonstrating: distended gallbladder with gallbladder wall thickening and pericholecystic  inflammatory stranding concerning for acute cholecystitis. Common bile duct is dilated measuring 14 mm in diameter. She is on the schedule for ERCP today with Dr Lynn.  She was admitted on 2025 for Gallstone pancreatitis.     This morning her abdominal pain has resolved. No nausea or vomiting. She is afebrile and hemodynamically stable. Interesting, the patient and her daughter state that her epigastric/chest pain resolved with placement of nitropaste.      Blood thinners: ELIQUIS (uncertain last dose)  Abdominal Surgery Hx: Hysterectomy        Past Medical History:   Diagnosis Date    Gastrointestinal disorder     Hypertension      No past surgical history on file.  Current Facility-Administered Medications   Medication Dose Route Frequency    apixaban (ELIQUIS) tablet 2.5 mg  2.5 mg Oral BID    aspirin EC tablet 81 mg  81 mg Oral Daily    atorvastatin (LIPITOR) tablet 20 mg  20 mg Oral Nightly    metoprolol succinate (TOPROL XL) extended release tablet 25 mg  25 mg Oral Daily    oxyCODONE (ROXICODONE) immediate release tablet 10 mg  10 mg Oral Q4H PRN    pantoprazole (PROTONIX) tablet 40 mg  40 mg Oral QAM AC    sodium chloride flush 0.9 % injection 5-40 mL  5-40 mL IntraVENous 2 times per day    sodium chloride flush 0.9 %

## 2025-02-06 NOTE — ANESTHESIA POSTPROCEDURE EVALUATION
Department of Anesthesiology  Postprocedure Note    Patient: Kortney Ruggiero  MRN: [id number]  YOB: 1940  Date of evaluation: 2/6/2025    Procedure Summary       Date: 02/06/25 Room / Location: [facility] ENDO FLOURO 1 / [facility] ENDOSCOPY    Anesthesia Start: 1408 Anesthesia Stop: 1511    Procedure: ENDOSCOPIC RETROGRADE CHOLANGIOPANCREATOGRAPHY, balloon sweep, stent placement (Upper GI Region) Diagnosis:       Common bile duct dilatation      (Common bile duct dilatation [K83.8])    Surgeons: Karina Anthony MD Responsible Provider: Haily Bah MD    Anesthesia Type: General ASA Status: 3 - Emergent            Anesthesia Type: General    Nohelia Phase I: Nohelia Score: 10    Nohelia Phase II:      Anesthesia Post Evaluation    Patient location during evaluation: PACU  Patient participation: complete - patient participated  Level of consciousness: awake and alert  Airway patency: patent  Nausea: well controlled.  Cardiovascular status: acceptable.  Respiratory status: acceptable  Hydration status: stable  Pain management: adequate    No notable events documented.

## 2025-02-06 NOTE — ED NOTES
TRANSFER - OUT REPORT:    Verbal report given to Jonh GALVAN on Kortney Ruggiero  being transferred to [facility]  for routine progression of patient care       Report consisted of patient's Situation, Background, Assessment and   Recommendations(SBAR).     Information from the following report(s) Nurse Handoff Report was reviewed with the receiving nurse.    Lines:   Peripheral IV 02/05/25 Left Antecubital (Active)   Site Assessment Clean, dry & intact 02/05/25 1628   Line Status Blood return noted;Normal saline locked;Capped;Flushed;Specimen collected 02/05/25 1628   Phlebitis Assessment No symptoms 02/05/25 1628   Infiltration Assessment 0 02/05/25 1628   Dressing Status New dressing applied;Clean, dry & intact 02/05/25 1628   Dressing Type Transparent 02/05/25 1628   Dressing Intervention New 02/05/25 1628        Opportunity for questions and clarification was provided.      Patient transported with:  [employer]

## 2025-02-06 NOTE — PLAN OF CARE
Problem: Discharge Planning  Goal: Discharge to home or other facility with appropriate resources  2/6/2025 0856 by Tatum Kincaid, RN  Outcome: Progressing  Flowsheets (Taken 2/6/2025 0833)  Discharge to home or other facility with appropriate resources: Identify barriers to discharge with patient and caregiver  2/6/2025 035 by Sergio Chavez, RN  Outcome: Progressing     Problem: Safety - Adult  Goal: Free from fall injury  Outcome: Progressing     Problem: ABCDS Injury Assessment  Goal: Absence of physical injury  Outcome: Progressing

## 2025-02-06 NOTE — PROGRESS NOTES
4 Eyes Skin Assessment     NAME:  Kortney Ruggiero  YOB: 1940  MEDICAL RECORD NUMBER:  [id number]    The patient is being assessed for  Admission    I agree that at least one RN has performed a thorough Head to Toe Skin Assessment on the patient. ALL assessment sites listed below have been assessed.      Areas assessed by both nurses:    Head, Face, Ears, Shoulders, Back, Chest, Arms, Elbows, Hands, Sacrum. Buttock, Coccyx, Ischium, and Legs. Feet and Heels        Does the Patient have a Wound? No noted wound(s)       Celio Prevention initiated by RN: Yes  Wound Care Orders initiated by RN: No    Pressure Injury (Stage 3,4, Unstageable, DTI, NWPT, and Complex wounds) if present, place Wound referral order by RN under : No    New Ostomies, if present place, Ostomy referral order under : No     Nurse 1 eSignature: Electronically signed by Sergio Chavez RN on 2/6/25 at 3:56 AM EST    **SHARE this note so that the co-signing nurse can place an eSignature**    Nurse 2 eSignature: {Esignature:[id number]}

## 2025-02-06 NOTE — ASSESSMENT & PLAN NOTE
- CT remarks on distended gallbladder with wall thickening and pericholecystic stranding  - General Surgery consulted  - IV zosyn for coverage

## 2025-02-06 NOTE — ANESTHESIA PRE PROCEDURE
Department of Anesthesiology  Preprocedure Note       Name:  Kortney Ruggiero   Age:  84 y.o.  :  1940                                          MRN:  [id number]         Date:  2025      Surgeon: Surgeon(s):  Karina Anthony MD    Procedure: Procedure(s):  ENDOSCOPIC RETROGRADE CHOLANGIOPANCREATOGRAPHY    Medications prior to admission:   Prior to Admission medications    Medication Sig Start Date End Date Taking? Authorizing Provider   melatonin 3 MG TABS tablet Take 10 mg by mouth at bedtime   Yes Lay Santiago MD   Multiple Vitamins-Minerals (WOMENS 50+ ADVANCED PO) Take 1 tablet by mouth every morning   Yes ProviderLay MD   Calcium 200 MG TABS Take 1 tablet by mouth every morning   Yes Lay Santiago MD   Alpha-D-Galactosidase (BEANO PO) Take by mouth daily   Yes Lay Santiago MD   apixaban (ELIQUIS) 2.5 MG TABS tablet Take 1 tablet by mouth 2 times daily 21  Yes Automatic Reconciliation, Ar   aspirin 81 MG EC tablet Take 1 tablet by mouth daily 21  Yes Automatic Reconciliation, Ar   atorvastatin (LIPITOR) 20 MG tablet Take 1 tablet by mouth 21  Yes Automatic Reconciliation, Ar   famotidine (PEPCID) 20 MG tablet TAKE 1 TABLET BY MOUTH EVERY DAY AT BEDTIME AS NEEDED 21  Yes Automatic Reconciliation, Ar   lansoprazole (PREVACID) 15 MG delayed release capsule Take by mouth every morning (before breakfast)   Yes Automatic Reconciliation, Ar   loratadine (CLARITIN) 10 MG tablet Take 1 tablet by mouth daily 21  Yes Automatic Reconciliation, Ar   metoprolol succinate (TOPROL XL) 25 MG extended release tablet Take 1 tablet by mouth daily 21  Yes Automatic Reconciliation, Ar   traMADol (ULTRAM) 50 MG tablet  22   Lay Santiago MD   oxyCODONE (ROXICODONE) 5 MG immediate release tablet  22   Lay Santiago MD   Doxylamine Succinate, Sleep, (UNISOM PO) Take by mouth at bedtime  Patient not taking: Reported on 2025    Pamela

## 2025-02-06 NOTE — H&P
Hospitalist History and Physical   Admit Date:  2025  3:28 AM   Name:  Kortney Ruggiero   Age:  84 y.o.  Sex:  female  :  1940   MRN:  [id number]     Presenting Complaint: chest pain  Reason(s) for Admission: Gallstone pancreatitis [K85.10]     History of Present Illness:   Kortney Ruggiero is a 84 y.o. female with medical history of HTN, CHF, PAF who presented to the [facility] ED with chest pain.  Reports that it started earlier today and has worsened.  Pain radiates to her back.  Upon ER evaluation, troponins are stable x2.  EKG is also unremarkable.  However, lipase is 1,250.  WBC is 6.0.  CT A/P shows:  IMPRESSION:  1. Distended gallbladder with gallbladder wall thickening and pericholecystic  inflammatory stranding concerning for acute cholecystitis. Common bile duct is dilated measuring 14 mm in diameter. Recommend further evaluation with  gallbladder ultrasound and MRCP.  2. Peripancreatic stranding adjacent to the pancreatic head and neck suggestive of mild acute pancreatitis.  3. Additional findings as above.  GI and General Surgery consulted.  Recommended admission to [facility] with MRCP.  Hospitalist asked to admit.    Review of Systems:  10 systems reviewed and negative except as noted in HPI.  Assessment & Plan:   * Gallstone pancreatitis  Assessment & Plan  - Concern for gallstone pancreatitis  - NPO except ice chips and sips with meds  - GI and General Surgery consulted  - MRCP ordered  - PRN antiemetics and pain meds    Cholecystitis  Assessment & Plan  - CT remarks on distended gallbladder with wall thickening and pericholecystic stranding  - General Surgery consulted  - IV zosyn for coverage    PAF (paroxysmal atrial fibrillation) ([facility])  Assessment & Plan  - Rate-controlled  - Continue home meds    Essential hypertension  Assessment & Plan  - BP stable  - Continue home meds    Chronic HFrEF (heart failure with reduced ejection fraction) ([facility])  Assessment & Plan  - Not in exacerbation  - Continue

## 2025-02-06 NOTE — PERIOP NOTE
TRANSFER - OUT REPORT:    Verbal report given to MANDY Muniz on Kortney Ruggiero  being transferred to [facility] for routine post-op       Report consisted of patient’s Situation, Background, Assessment and   Recommendations(SBAR).     Information from the following report(s) Nurse Handoff Report, Adult Overview, Surgery Report, MAR, Cardiac Rhythm NSR with PACs, Quality Measures, and Neuro Assessment was reviewed with the receiving nurse.    Lines:   Peripheral IV 02/05/25 Left Antecubital (Active)   Site Assessment Clean, dry & intact 02/06/25 1510   Line Status Flushed;Infusing 02/06/25 1510   Line Care Connections checked and tightened;Cap changed 02/06/25 1201   Phlebitis Assessment No symptoms 02/06/25 1510   Infiltration Assessment 0 02/06/25 1510   Alcohol Cap Used Yes 02/06/25 1201   Dressing Status Clean, dry & intact 02/06/25 1510   Dressing Type Transparent 02/06/25 1510   Dressing Intervention New 02/05/25 1628        Opportunity for questions and clarification was provided.      Patient transported with:   [employer]    VTE prophylaxis orders have been written for Kortney Ruggiero.    Patient and family given floor number and nurses name.  Family updated re: pt status after security code verified.

## 2025-02-06 NOTE — SIGNIFICANT EVENT
Daughter, Lolita (Dell), reports that she is patient's POA and primary caretaker/\"\" as patient does speak and understands English seemingly well, however Upper sorbian is her primary language, and daughter worries that she may only really understand ~\"40%\" of what we are discussing.    If decisions need to be made for surgery (or anything requiring consent), daughter would like to be contacted if she is not at bedside.  Phone # is [phone].    Daughter plans on returning to hospital in AM to be present.  Requests to spend the night for ease of communication/clarification and care.

## 2025-02-06 NOTE — PROGRESS NOTES
Hospitalist Progress Note   Admit Date:  2025  3:28 AM   Name:  Kortney Ruggiero   Age:  84 y.o.  Sex:  female  :  1940   MRN:  [id number]   Room:      Presenting/Chief Complaint: No chief complaint on file.     Reason(s) for Admission: Gallstone pancreatitis [K85.10]     Hospital Course:   Patient is an 83 y/o female with medical history of hypertension, diastolic dysfunction, paroxysmal atrial fibrillation on apixaban who presented to [facility] ED with cc chest pain that began earlier in the day and has worsened.  Pain radiates to her back.  Upon ER evaluation, troponins are stable x2.  EKG is also unremarkable.  However, lipase is 1,250.  WBC is 6.0.  CT A/P shows distended gallbladder with gallbladder wall thickening and pericholecystic stranding with concern for acute cholecystitis and common bile duct dilation, also acute pancreatitis. No evidence of sepsis. Patient was transferred to [facility] for Hospitalist admission. GI consulted with plans for ERCP today. General surgery following for likely cholecystectomy but may be outpatient.    Subjective & 24hr Events:   NPO for ERCP this afternoon.  Unfortunately her son's  is tomorrow evening so ideally will discharge home tomorrow morning. Surgery reportedly feels they could do cholecystectomy outpatient in quick turn around time to facilitate the  needs.  Await ERCP.  Continue to trend labs.  Alert, oriented, conversational. Family at bedside.    Assessment & Plan:     Gallstone pancreatitis  Elevated LFT's  ERCP this afternoon with GI  PRN analgesia and antiemetics  Daily CMP  Trend lipase  NPO with IVF    Cholecystitis  No evidence of sepsis  Afebrile, no leukocytosis  General surgery on board, Zosyn for antibiotic coverage, may move outpatient due to family needs    Diastolic dysfunction  TTE  ([facility]): low normal systolic function, grade 1 LV DD  Volume status stable  Continue metoprolol    Essential

## 2025-02-06 NOTE — PROGRESS NOTES
TRANSFER - OUT REPORT:    Verbal report given to Susan Maxwellop RN on Kortney Ruggiero  being transferred to [facility] for ordered procedure       Report consisted of patient's Situation, Background, Assessment and   Recommendations(SBAR).     Information from the following report(s) Nurse Handoff Report was reviewed with the receiving nurse.           Lines:   Peripheral IV 02/05/25 Left Antecubital (Active)   Site Assessment Clean, dry & intact 02/06/25 0858   Line Status Flushed;Normal saline locked 02/06/25 0858   Line Care Connections checked and tightened;Cap changed 02/06/25 0858   Phlebitis Assessment No symptoms 02/06/25 0858   Infiltration Assessment 0 02/06/25 0858   Alcohol Cap Used Yes 02/06/25 0858   Dressing Status Clean, dry & intact 02/06/25 0858   Dressing Type Transparent 02/06/25 0858   Dressing Intervention New 02/05/25 1628        Opportunity for questions and clarification was provided.      Patient transported with:

## 2025-02-06 NOTE — ASSESSMENT & PLAN NOTE
- Concern for gallstone pancreatitis  - NPO except ice chips and sips with meds  - GI and General Surgery consulted  - MRCP ordered  - PRN antiemetics and pain meds

## 2025-02-06 NOTE — CONSULTS
Consult Note            Date:2/6/2025        Patient Name:Kortney Ruggiero     YOB: 1940     Age:84 y.o.    Inpatient consult to GI  Consult performed by: Bindu Cesar APRN - CNP  Consult ordered by: Kristin Hoffmann MD          Chief Complaint   No chief complaint on file.       History Obtained From   patient, family member - daughter    History of Present Illness   Kortney Ruggiero is an 85 yo female who presented to the ED with chest pain. PMH includes HTN, CHF, PAF. GI was consulted for gallstone pancreatitis. Patient's daughter (DELMER) Lolita, states she began experiencing chest pain yesterday that was very intense they brought her to the ED. She was also having high BP which the daughter states she has not taken her losartan in a few months. She denies nausea, vomiting or abdominal pain. She denies history of liver disease. She denies any history of gallbladder issues or pancreatitis. She denies any new medications. She denies alcohol or tobacco use. She is on Eliquis however she has ran out so her last dose was Tuesday. She denies NSAID use. CT AP showed concerns for acute cholecystitis as well as CBD dilated to 14mm and acute pancreatitis. LFT's elevated.     Labs: Bili 1.2, , , Alk Phos 83, Lipase 1250    Medications: Eliquis, Aspirin, PPI     Imaging: CT ABDOMEN PELVIS W IV CONTRAST Additional Contrast? None    Result Date: 2/5/2025  1. Distended gallbladder with gallbladder wall thickening and pericholecystic inflammatory stranding concerning for acute cholecystitis. Common bile duct is dilated measuring 14 mm in diameter. Recommend further evaluation with gallbladder ultrasound and MRCP. 2. Peripancreatic stranding adjacent to the pancreatic head and neck suggestive of mild acute pancreatitis. 3. Additional findings as above.    Past Medical History     Past Medical History:   Diagnosis Date    Gastrointestinal disorder     Hypertension         Past Surgical History   No past  presented with chest pain that began yesterday. CT AP showed concerns for acute cholecystitis as well as a CBD dilated to 14mm and acute pancreatitis. Discussed case with MD and will plan for ERCP today. NPO for procedure.     Patient's son passed away last week and the services are tomorrow at 7pm and she would like to go if this is at all possible.     Electronically signed by THOMAS Richmond CNP on 2/6/25 at 8:24 AM EST

## 2025-02-06 NOTE — PROGRESS NOTES
Database has been completed with the exception of home med list. Patient unable to verify. Per patient, her daughter will bring a list/meds in the morning. Please complete.

## 2025-02-06 NOTE — PERIOP NOTE
TRANSFER - IN REPORT:    Verbal report received from MANDY Muniz on Kortney Ruggiero  being received from Room 221 for ordered procedure      Report consisted of patient's Situation, Background, Assessment and   Recommendations(SBAR).     Information from the following report(s) Nurse Handoff Report, Index, ED Encounter Summary, ED SBAR, Adult Overview, Intake/Output, MAR, Pre Procedure Checklist, Procedure Verification, Quality Measures, and Event Log was reviewed with the receiving nurse.    Opportunity for questions and clarification was provided.      Assessment completed upon patient's arrival to unit and care assumed.

## 2025-02-06 NOTE — OP NOTE
Procedure: ERCP with stent placement    Indication: Dilated CBD; suspected CBD stone    Date of Procedure: 2/6/2025    Patient profile: Refer to patient note in chart for documentation of history and physical.    Providers: Karina Anthony MD    Referring MD: Dr. Keller    PCP: Arron Carreno APRN - NP    Medicines: General Anesthesia    Complication: No immediate complications.     Estimated blood loss: Minimal    Procedure: After the risks including, but not limited to medication reaction, infection, bleeding, perforation, missed lesions, benefits and alternatives of the procedure were discussed with the patient and all questions were answered, informed consent was obtained. The duodenoscope was passed under direct vision throughout the procedure, the patient's blood pressure pulse and oxygen saturation were monitored continuously. The scope was introduced through the mouth and advanced to the second part of duodenum. The endoscopy was performed without difficulty. The patient tolerated the procedure well. The ERCP was performed with the patient in the  Prone position.    Findings:     films obtained prior to start the procedure was normal.    The duodenoscope was introduced from the mouth advanced into the esophagus into the stomach, and into the level of the ampulla. The ampulla appeared normal. Next, a Rx 44 sphincterotome with 0.035 inch semi-stiff guidewire was introduced, and cannulation of the bile duct was attempted using the wire first cannulation technique. This was successful. The wire was advanced deep into the intrahepatic ducts, followed by the sphincterotome over the wire. A cholangiogram was obtained, and the bile duct was visualized under fluoroscopy.    A dilated bile duct was identified.  The bile duct measured 12 mm.  A distal CBD narrowing was noted, but this does NOT appear consistent with a stricture.  A bloodless sphincterotomy was not performed given Eliquis usage. The sphincterotome was

## 2025-02-07 VITALS
HEIGHT: 59 IN | HEART RATE: 72 BPM | TEMPERATURE: 97.7 F | OXYGEN SATURATION: 94 % | BODY MASS INDEX: 24.19 KG/M2 | DIASTOLIC BLOOD PRESSURE: 70 MMHG | SYSTOLIC BLOOD PRESSURE: 132 MMHG | WEIGHT: 120 LBS | RESPIRATION RATE: 20 BRPM

## 2025-02-07 LAB
ALBUMIN SERPL-MCNC: 3.1 G/DL (ref 3.2–4.6)
ALBUMIN/GLOB SERPL: 0.9 (ref 1–1.9)
ALP SERPL-CCNC: 71 U/L (ref 35–104)
ALT SERPL-CCNC: 72 U/L (ref 8–45)
ANION GAP SERPL CALC-SCNC: 11 MMOL/L (ref 7–16)
AST SERPL-CCNC: 65 U/L (ref 15–37)
BASOPHILS # BLD: 0.02 K/UL (ref 0–0.2)
BASOPHILS NFR BLD: 0.3 % (ref 0–2)
BILIRUB SERPL-MCNC: 1 MG/DL (ref 0–1.2)
BUN SERPL-MCNC: 17 MG/DL (ref 8–23)
CALCIUM SERPL-MCNC: 8.3 MG/DL (ref 8.8–10.2)
CHLORIDE SERPL-SCNC: 108 MMOL/L (ref 98–107)
CO2 SERPL-SCNC: 20 MMOL/L (ref 20–29)
CREAT SERPL-MCNC: 0.75 MG/DL (ref 0.6–1.1)
DIFFERENTIAL METHOD BLD: ABNORMAL
EOSINOPHIL # BLD: 0.01 K/UL (ref 0–0.8)
EOSINOPHIL NFR BLD: 0.1 % (ref 0.5–7.8)
ERYTHROCYTE [DISTWIDTH] IN BLOOD BY AUTOMATED COUNT: 13 % (ref 11.9–14.6)
GLOBULIN SER CALC-MCNC: 3.5 G/DL (ref 2.3–3.5)
GLUCOSE SERPL-MCNC: 111 MG/DL (ref 70–99)
HCT VFR BLD AUTO: 37.7 % (ref 35.8–46.3)
HGB BLD-MCNC: 12.7 G/DL (ref 11.7–15.4)
IMM GRANULOCYTES # BLD AUTO: 0.02 K/UL (ref 0–0.5)
IMM GRANULOCYTES NFR BLD AUTO: 0.3 % (ref 0–5)
LIPASE SERPL-CCNC: 494 U/L (ref 13–60)
LYMPHOCYTES # BLD: 0.99 K/UL (ref 0.5–4.6)
LYMPHOCYTES NFR BLD: 14.2 % (ref 13–44)
MCH RBC QN AUTO: 31.8 PG (ref 26.1–32.9)
MCHC RBC AUTO-ENTMCNC: 33.7 G/DL (ref 31.4–35)
MCV RBC AUTO: 94.5 FL (ref 82–102)
MONOCYTES # BLD: 0.44 K/UL (ref 0.1–1.3)
MONOCYTES NFR BLD: 6.3 % (ref 4–12)
NEUTS SEG # BLD: 5.51 K/UL (ref 1.7–8.2)
NEUTS SEG NFR BLD: 78.8 % (ref 43–78)
NRBC # BLD: 0 K/UL (ref 0–0.2)
PLATELET # BLD AUTO: 143 K/UL (ref 150–450)
PMV BLD AUTO: 10.1 FL (ref 9.4–12.3)
POTASSIUM SERPL-SCNC: 3.9 MMOL/L (ref 3.5–5.1)
PROT SERPL-MCNC: 6.5 G/DL (ref 6.3–8.2)
RBC # BLD AUTO: 3.99 M/UL (ref 4.05–5.2)
SODIUM SERPL-SCNC: 139 MMOL/L (ref 136–145)
WBC # BLD AUTO: 7 K/UL (ref 4.3–11.1)

## 2025-02-07 PROCEDURE — 36415 COLL VENOUS BLD VENIPUNCTURE: CPT

## 2025-02-07 PROCEDURE — 85025 COMPLETE CBC W/AUTO DIFF WBC: CPT

## 2025-02-07 PROCEDURE — 99232 SBSQ HOSP IP/OBS MODERATE 35: CPT | Performed by: INTERNAL MEDICINE

## 2025-02-07 PROCEDURE — 2580000003 HC RX 258: Performed by: FAMILY MEDICINE

## 2025-02-07 PROCEDURE — 80053 COMPREHEN METABOLIC PANEL: CPT

## 2025-02-07 PROCEDURE — 6360000002 HC RX W HCPCS: Performed by: FAMILY MEDICINE

## 2025-02-07 PROCEDURE — 83690 ASSAY OF LIPASE: CPT

## 2025-02-07 PROCEDURE — 6370000000 HC RX 637 (ALT 250 FOR IP): Performed by: FAMILY MEDICINE

## 2025-02-07 RX ORDER — ASPIRIN 81 MG/1
81 TABLET ORAL DAILY
Qty: 30 TABLET | Refills: 0 | Status: SHIPPED | OUTPATIENT
Start: 2025-02-08

## 2025-02-07 RX ORDER — ASPIRIN 81 MG/1
81 TABLET ORAL DAILY
Qty: 30 TABLET | Refills: 0 | Status: SHIPPED | OUTPATIENT
Start: 2025-02-08 | End: 2025-02-07

## 2025-02-07 RX ADMIN — PIPERACILLIN AND TAZOBACTAM 3375 MG: 3; .375 INJECTION, POWDER, LYOPHILIZED, FOR SOLUTION INTRAVENOUS at 06:40

## 2025-02-07 RX ADMIN — PANTOPRAZOLE SODIUM 40 MG: 40 TABLET, DELAYED RELEASE ORAL at 06:27

## 2025-02-07 RX ADMIN — ASPIRIN 81 MG: 81 TABLET, COATED ORAL at 10:36

## 2025-02-07 RX ADMIN — METOPROLOL SUCCINATE 25 MG: 25 TABLET, EXTENDED RELEASE ORAL at 10:36

## 2025-02-07 ASSESSMENT — PAIN SCALES - GENERAL: PAINLEVEL_OUTOF10: 0

## 2025-02-07 NOTE — CARE COORDINATION
02/07/25 1418   Services At/After Discharge   Transition of Care Consult (CM Consult) N/A   Services At/After Discharge None    Resource Information Provided? No   Mode of Transport at Discharge Other (see comment)  (Daughter to transport by car.)   Confirm Follow Up Transport Family   Condition of Participation: Discharge Planning   The Plan for Transition of Care is related to the following treatment goals: Home with family assistance   The Patient and/Or Patient Representative agree with the Discharge Plan? Yes     Discharge order placed. Milestones met. CM met with patient and her daughter. Patient voices agreement to discharge home today. No CM needs identified.

## 2025-02-07 NOTE — DISCHARGE SUMMARY
Hospitalist Discharge Summary   Admit Date:  2025  3:28 AM   DC Note date: 2025  Name:  Kortney Ruggiero   Age:  84 y.o.  Sex:  female  :  1940   MRN:  [id number]   Room:  221/01  PCP:  Arron Carreno APRN - NP    Presenting Complaint: No chief complaint on file.     Initial Admission Diagnosis: Gallstone pancreatitis [K85.10]     Problem List for this Hospitalization (present on admission):    Principal Problem:    Gallstone pancreatitis  Active Problems:    Essential hypertension    PAF (paroxysmal atrial fibrillation) (HCC)    Cholecystitis    Common bile duct dilatation    Diastolic dysfunction  Resolved Problems:    * No resolved hospital problems. *      Hospital Course:  Patient is an 83 y/o female with medical history of hypertension, diastolic dysfunction, paroxysmal atrial fibrillation on apixaban who presented to [facility] ED with c/o chest pain radiating back. In the ER, labs showed elevated troponin at 18.9, elevated LFTs,elevated lipase at 1250. CT abdomen/pelvis showed distended gallbladder with gallbladder wall thickening and pericholecystic stranding with concern for acute cholecystitis and common bile duct dilation, also acute pancreatitis. Patient transferred to [facility] under hospitalist service for further management and evaluation. Patient was NPO. General surgery and GI consulted. S/p  ERCP with stent placement 25. No complications noted/reported. Patient seen today. Daughter at bedside. Reports mild abdominal pain. No nausea or vomiting. Was advanced from CL to regular diet and is tolerating. Continue present medical management. General surgery and GI signed off/cleared. Discharge patient home. Resume home medications. Medications discontinued noted on med rec. Prescriptions sent to pharmacy. Follow up with PCP in 1-2 weeks. Follow up with consults as directed.            Disposition: Home  Diet: ADULT DIET; Regular  Code Status: Full Code    Follow Ups:  Follow-up Information

## 2025-02-07 NOTE — PROGRESS NOTES
Kortney Ruggiero is 84 y.o. y/o female     Kortney Ruggiero is an 83 yo female who presented to the ED with chest pain. PMH includes HTN, CHF, PAF. GI was consulted for gallstone pancreatitis. ERCP completed yesterday where a stent was placed.     Today patient resting in bed feeling better. She is having some mild abdominal discomfort but tolerating clear liquid diet. Denies nausea and vomiting. Asking to be discharged.       PE:   Vitals:    02/07/25 0740   BP: 132/70   Pulse: 72   Resp: 20   Temp: 97.7 °F (36.5 °C)   SpO2: 94%      General:  The patient appears well-nourished, and is in no acute distress.    HEENT:  Normocephalic, atraumatic. No sclerae icterus.      Abdomen:  Soft, non tender to palpation. No distention. Normoactive bowel sounds present.    Neurologic:  Alert and oriented x3.  Psychiatric: Appropriate mood and affect.    Assessment and Plan:   #Gallstone Pancreatitis: Patient had ERCP yesterday and a stent was placed. Needs to follow up with a GI doctor in 6-8 weeks to have stent exchanged/removed (may be moving to [place] so may need to find GI doctor there). Tolerating clears. Can advance diet for lunch. If tolerating solids okay from GI standpoint to be discharged. Recommend following up with surgery.     GI will sign off at this time. Please contact us with any questions or concerns. Thank you.      Bindu Cesar, APRN - CNP

## 2025-02-07 NOTE — PLAN OF CARE
Problem: Discharge Planning  Goal: Discharge to home or other facility with appropriate resources  Outcome: Completed  Flowsheets (Taken 2/7/2025 7020)  Discharge to home or other facility with appropriate resources:   Identify barriers to discharge with patient and caregiver   Refer to discharge planning if patient needs post-hospital services based on physician order or complex needs related to functional status, cognitive ability or social support system     Problem: Safety - Adult  Goal: Free from fall injury  Outcome: Completed     Problem: ABCDS Injury Assessment  Goal: Absence of physical injury  Outcome: Completed     Problem: Pain  Goal: Verbalizes/displays adequate comfort level or baseline comfort level  Outcome: Completed

## 2025-02-07 NOTE — PROGRESS NOTES
H&P/Consult Note/Progress Note/Office Note:   Kortney Ruggiero  MRN: [id number]  :1940  Age:84 y.o.    HPI: Kortney Ruggiero is a 84 y.o. female who we are asked by IM to see for Gallstone Pancreatitis. The patient has a PMHx of HTN, CHF, PAF on Eliquis (has not taken since before yesterday). She presented with chest pain that was worked up with Cardiac etiology ruled out. She had been having chest pain for the past week. Of note, her son passed away last week and /services are scheduled for tomorrow.      Admission Lipase 1250 without elevation of T bili, mild trasaminitis and CT abdomen demonstrating: distended gallbladder with gallbladder wall thickening and pericholecystic  inflammatory stranding concerning for acute cholecystitis. Common bile duct is dilated measuring 14 mm in diameter. She is on the schedule for ERCP today with Dr Lynn.  She was admitted on 2025 for Gallstone pancreatitis.     This morning her abdominal pain has resolved. No nausea or vomiting. She is afebrile and hemodynamically stable. Interesting, the patient and her daughter state that her epigastric/chest pain resolved with placement of nitropaste.      Blood thinners: ELIQUIS (uncertain last dose)  Abdominal Surgery Hx: Hysterectomy        Past Medical History:   Diagnosis Date    Gastrointestinal disorder     Hypertension      History reviewed. No pertinent surgical history.  Current Facility-Administered Medications   Medication Dose Route Frequency    aspirin EC tablet 81 mg  81 mg Oral Daily    atorvastatin (LIPITOR) tablet 20 mg  20 mg Oral Nightly    metoprolol succinate (TOPROL XL) extended release tablet 25 mg  25 mg Oral Daily    oxyCODONE (ROXICODONE) immediate release tablet 10 mg  10 mg Oral Q4H PRN    pantoprazole (PROTONIX) tablet 40 mg  40 mg Oral QAM AC    sodium chloride flush 0.9 % injection 5-40 mL  5-40 mL IntraVENous 2 times per day    sodium chloride flush 0.9 % injection 5-40 mL  5-40 mL IntraVENous PRN

## 2025-02-07 NOTE — PROGRESS NOTES
Physician Progress Note      PATIENT:               FERMÍN BAEZ  CSN #:                  [id number]  :                       1940  ADMIT DATE:       2025 3:28 AM  DISCH DATE:  RESPONDING  PROVIDER #:        Angela Valerio MD          QUERY TEXT:    Patient admitted with gallstone pancreatitis, noted to have atrial   fibrillation and is maintained on Eliquis. If possible, please document in   progress notes and discharge summary if you are evaluating and/or treating any   of the following:?  ?  The medical record reflects the following:  Risk Factors: Hist of afib    Clinical Indicators: afib on anticoagulation    IM (): PAF (paroxysmal atrial fibrillation) (HCC)  Chronic anticoagulation  Apixaban on hold, continue metoprolol    Gen Surg (): The patient has a PMHx of HTN, CHF, PAF on Eliquis (has not   taken since before yesterday).    DCS: Discharge patient home. Resume home medications.    Treatment: Resume home Eliquis 2.5mg BID per DCS, metoprolol, lab monitoring    ThanksMarylu RN, CDI  Options provided:  -- Secondary hypercoagulable state in a patient with atrial fibrillation  -- Other - I will add my own diagnosis  -- Disagree - Not applicable / Not valid  -- Disagree - Clinically unable to determine / Unknown  -- Refer to Clinical Documentation Reviewer    PROVIDER RESPONSE TEXT:    This patient has secondary hypercoagulable state in a patient with atrial   fibrillation.    Query created by: Marylu Rosas on 2025 2:13 PM      Electronically signed by:  Angela Valerio MD 2025 2:24 PM

## 2025-02-08 ENCOUNTER — HOSPITAL ENCOUNTER (EMERGENCY)
Age: 85
Discharge: HOME | End: 2025-02-09
Attending: STUDENT IN AN ORGANIZED HEALTH CARE EDUCATION/TRAINING PROGRAM
Payer: MEDICARE

## 2025-02-08 ENCOUNTER — APPOINTMENT (OUTPATIENT)
Dept: GENERAL RADIOLOGY | Age: 85
End: 2025-02-08
Payer: MEDICARE

## 2025-02-08 DIAGNOSIS — K91.89 POST-ERCP ACUTE PANCREATITIS: Primary | ICD-10-CM

## 2025-02-08 DIAGNOSIS — K85.90 POST-ERCP ACUTE PANCREATITIS: Primary | ICD-10-CM

## 2025-02-08 LAB
ALBUMIN SERPL-MCNC: 3.4 G/DL (ref 3.2–4.6)
ALBUMIN/GLOB SERPL: 0.9 (ref 1–1.9)
ALP SERPL-CCNC: 78 U/L (ref 35–104)
ALT SERPL-CCNC: 57 U/L (ref 8–45)
ANION GAP SERPL CALC-SCNC: 13 MMOL/L (ref 7–16)
AST SERPL-CCNC: 46 U/L (ref 15–37)
BASOPHILS # BLD: 0.04 K/UL (ref 0–0.2)
BASOPHILS NFR BLD: 0.5 % (ref 0–2)
BILIRUB SERPL-MCNC: 0.5 MG/DL (ref 0–1.2)
BUN SERPL-MCNC: 11 MG/DL (ref 8–23)
CALCIUM SERPL-MCNC: 8.7 MG/DL (ref 8.8–10.2)
CHLORIDE SERPL-SCNC: 102 MMOL/L (ref 98–107)
CO2 SERPL-SCNC: 22 MMOL/L (ref 20–29)
CREAT SERPL-MCNC: 0.76 MG/DL (ref 0.6–1.1)
DIFFERENTIAL METHOD BLD: NORMAL
EKG ATRIAL RATE: 74 BPM
EKG DIAGNOSIS: NORMAL
EKG P AXIS: 44 DEGREES
EKG P-R INTERVAL: 210 MS
EKG Q-T INTERVAL: 450 MS
EKG QRS DURATION: 90 MS
EKG QTC CALCULATION (BAZETT): 499 MS
EKG R AXIS: 6 DEGREES
EKG T AXIS: 15 DEGREES
EKG VENTRICULAR RATE: 74 BPM
EOSINOPHIL # BLD: 0.12 K/UL (ref 0–0.8)
EOSINOPHIL NFR BLD: 1.5 % (ref 0.5–7.8)
ERYTHROCYTE [DISTWIDTH] IN BLOOD BY AUTOMATED COUNT: 12.9 % (ref 11.9–14.6)
GLOBULIN SER CALC-MCNC: 3.8 G/DL (ref 2.3–3.5)
GLUCOSE SERPL-MCNC: 206 MG/DL (ref 70–99)
HCT VFR BLD AUTO: 40.8 % (ref 35.8–46.3)
HGB BLD-MCNC: 13.8 G/DL (ref 11.7–15.4)
IMM GRANULOCYTES # BLD AUTO: 0.02 K/UL (ref 0–0.5)
IMM GRANULOCYTES NFR BLD AUTO: 0.3 % (ref 0–5)
LIPASE SERPL-CCNC: 125 U/L (ref 13–60)
LYMPHOCYTES # BLD: 1.09 K/UL (ref 0.5–4.6)
LYMPHOCYTES NFR BLD: 13.7 % (ref 13–44)
MCH RBC QN AUTO: 31.1 PG (ref 26.1–32.9)
MCHC RBC AUTO-ENTMCNC: 33.8 G/DL (ref 31.4–35)
MCV RBC AUTO: 91.9 FL (ref 82–102)
MONOCYTES # BLD: 0.51 K/UL (ref 0.1–1.3)
MONOCYTES NFR BLD: 6.4 % (ref 4–12)
NEUTS SEG # BLD: 6.19 K/UL (ref 1.7–8.2)
NEUTS SEG NFR BLD: 77.6 % (ref 43–78)
NRBC # BLD: 0 K/UL (ref 0–0.2)
PLATELET # BLD AUTO: 174 K/UL (ref 150–450)
PMV BLD AUTO: 10 FL (ref 9.4–12.3)
POTASSIUM SERPL-SCNC: 3.9 MMOL/L (ref 3.5–5.1)
PROT SERPL-MCNC: 7.2 G/DL (ref 6.3–8.2)
RBC # BLD AUTO: 4.44 M/UL (ref 4.05–5.2)
SODIUM SERPL-SCNC: 137 MMOL/L (ref 136–145)
TROPONIN T SERPL HS-MCNC: 18 NG/L (ref 0–14)
TROPONIN T SERPL HS-MCNC: 18 NG/L (ref 0–14)
WBC # BLD AUTO: 8 K/UL (ref 4.3–11.1)

## 2025-02-08 PROCEDURE — 93010 ELECTROCARDIOGRAM REPORT: CPT | Performed by: INTERNAL MEDICINE

## 2025-02-08 PROCEDURE — 84484 ASSAY OF TROPONIN QUANT: CPT

## 2025-02-08 PROCEDURE — 6360000002 HC RX W HCPCS: Performed by: STUDENT IN AN ORGANIZED HEALTH CARE EDUCATION/TRAINING PROGRAM

## 2025-02-08 PROCEDURE — 93005 ELECTROCARDIOGRAM TRACING: CPT | Performed by: STUDENT IN AN ORGANIZED HEALTH CARE EDUCATION/TRAINING PROGRAM

## 2025-02-08 PROCEDURE — 96374 THER/PROPH/DIAG INJ IV PUSH: CPT

## 2025-02-08 PROCEDURE — 80053 COMPREHEN METABOLIC PANEL: CPT

## 2025-02-08 PROCEDURE — 85025 COMPLETE CBC W/AUTO DIFF WBC: CPT

## 2025-02-08 PROCEDURE — 96375 TX/PRO/DX INJ NEW DRUG ADDON: CPT

## 2025-02-08 PROCEDURE — 71045 X-RAY EXAM CHEST 1 VIEW: CPT

## 2025-02-08 PROCEDURE — 99285 EMERGENCY DEPT VISIT HI MDM: CPT

## 2025-02-08 PROCEDURE — 83690 ASSAY OF LIPASE: CPT

## 2025-02-08 RX ORDER — ONDANSETRON 4 MG/1
4 TABLET, FILM COATED ORAL 3 TIMES DAILY PRN
Qty: 15 TABLET | Refills: 2 | Status: SHIPPED | OUTPATIENT
Start: 2025-02-08

## 2025-02-08 RX ORDER — HYDROCODONE BITARTRATE AND ACETAMINOPHEN 5; 325 MG/1; MG/1
1 TABLET ORAL EVERY 6 HOURS PRN
Qty: 10 TABLET | Refills: 0 | Status: SHIPPED | OUTPATIENT
Start: 2025-02-08 | End: 2025-02-13

## 2025-02-08 RX ORDER — MORPHINE SULFATE 4 MG/ML
4 INJECTION, SOLUTION INTRAMUSCULAR; INTRAVENOUS ONCE
Status: COMPLETED | OUTPATIENT
Start: 2025-02-08 | End: 2025-02-08

## 2025-02-08 RX ORDER — ONDANSETRON 2 MG/ML
4 INJECTION INTRAMUSCULAR; INTRAVENOUS
Status: COMPLETED | OUTPATIENT
Start: 2025-02-08 | End: 2025-02-08

## 2025-02-08 RX ADMIN — MORPHINE SULFATE 4 MG: 4 INJECTION, SOLUTION INTRAMUSCULAR; INTRAVENOUS at 21:22

## 2025-02-08 RX ADMIN — ONDANSETRON 4 MG: 2 INJECTION, SOLUTION INTRAMUSCULAR; INTRAVENOUS at 21:22

## 2025-02-08 ASSESSMENT — PAIN SCALES - GENERAL
PAINLEVEL_OUTOF10: 4
PAINLEVEL_OUTOF10: 6

## 2025-02-08 ASSESSMENT — LIFESTYLE VARIABLES
HOW OFTEN DO YOU HAVE A DRINK CONTAINING ALCOHOL: NEVER
HOW MANY STANDARD DRINKS CONTAINING ALCOHOL DO YOU HAVE ON A TYPICAL DAY: PATIENT DOES NOT DRINK

## 2025-02-09 VITALS
SYSTOLIC BLOOD PRESSURE: 152 MMHG | OXYGEN SATURATION: 94 % | WEIGHT: 120 LBS | HEIGHT: 59 IN | RESPIRATION RATE: 11 BRPM | HEART RATE: 74 BPM | BODY MASS INDEX: 24.19 KG/M2 | DIASTOLIC BLOOD PRESSURE: 80 MMHG

## 2025-02-09 NOTE — ED TRIAGE NOTES
Pt presents to ED with c/o htn and chest pain. Pt was just discharged yesterday from [facility] after having a cardiac workup and ERCP. Today pt's blood pressure was elevated and would not come down so pt is back for further eval with concern for the htn.

## 2025-02-09 NOTE — DISCHARGE INSTRUCTIONS
Follow a clear liquid diet for the next 24 to 48 hours until symptoms improve.  Take the medication prescribed as needed for pain.  Use Zofran as needed for nausea and vomiting.  Continue to monitor blood pressures though anticipate this number to be abnormal if you are having discomfort.  Patient will return for pain that is uncontrolled with medication prescribed for any other concerns, questions or worsening symptoms.  Please arrange follow-up with your provider as directed upon discharge from your hospital admission.

## 2025-02-09 NOTE — DISCHARGE INSTR - COC
Name:   Address:  Phone:  Fax:    Dialysis Facility (if applicable)   Name:  Address:  Dialysis Schedule:  Phone:  Fax:    / signature: {Esignature:[id number]}    PHYSICIAN SECTION    Prognosis: {Prognosis:9460719184}    Condition at Discharge: { Patient Condition:178209177}    Rehab Potential (if transferring to Rehab): {Prognosis:5875497857}    Recommended Labs or Other Treatments After Discharge: ***    Physician Certification: I certify the above information and transfer of Kortney Ruggiero  is necessary for the continuing treatment of the diagnosis listed and that she requires {Admit to Appropriate Level of Care:03610} for {GREATER/LESS:647417936} 30 days.     Update Admission H&P: {CHP DME Changes in HandP:333141818}    PHYSICIAN SIGNATURE:  {Esignature:[id number]}

## 2025-02-09 NOTE — ED PROVIDER NOTES
threat to life or bodily function.   1 or more chronic illnesses with a severe exacerbation or progression.  Prescription drug management performed.  Parental controlled substances given in the ED.  Patient was discharged risks and benefits of hospitalization were considered.  Discussion with external consultants.  Chronic medical problems impacting care include history of hypertension.  Shared medical decision making was utilized in creating the patients health plan today.  I independently ordered and reviewed each unique test.    I reviewed external records: ED visit note from a different ED.   I reviewed external records: provider visit note from PCP.  I reviewed external records: provider visit note from outside specialist.   The patients assessment required an independent historian: Patient's daughter.  The reason they were needed is important historical information not provided by the patient.  ED cardiac monitoring rhythm strip was ordered and interpreted:  sinus rhythm, no evidence of an arrhythmia  ST Segments:Normal ST segments - NO STEMI   Rate: 71  I interpreted the X-rays clear, no focal infiltrate.    The management of this patient was discussed with an external consultant.            History     84-year-old female patient recently to this facility for gallbladder distention, pancreatitis subsequently discharged earlier today returns with reports of lower epigastric pain, chest pain, elevated blood pressure.  Patient arrives with her daughter has been caring for her at home.  She states her blood pressures persistently elevated despite multiple checks of this vital sign.  Daughter states she is checked patient blood pressure more than 10 times tonight and states it consistently is elevated.  Patient is reporting pain in the lower epigastrium that is constant nature, radiates through to her back.  The symptoms have been present throughout the day today following discharge from the hospital.  During

## 2025-02-10 ENCOUNTER — TELEPHONE (OUTPATIENT)
Dept: GASTROENTEROLOGY | Age: 85
End: 2025-02-10

## 2025-02-10 NOTE — TELEPHONE ENCOUNTER
----- Message from THOMAS Arellano CNP sent at 2/7/2025 10:20 AM EST -----  Hi! This patient is currently admitted but she needs a 6-8 week ERCP follow up for stent exchange/removal. However, her daughter is trying to move her to [place] so not sure if they will do that in the next 6-8 weeks. Call daughter for appointment. Lolita [phone]. Thanks!!!!

## 2025-02-10 NOTE — TELEPHONE ENCOUNTER
Scheduled ERCP 3/24, pt daughter aware, sent prep instructions via email  Faxed medication clearance to Dr. Weinberg ( Former[facility]) for patient to hold Eliquis prior to procedure

## 2025-03-21 RX ORDER — ASCORBIC ACID 500 MG
500 TABLET ORAL DAILY
COMMUNITY

## 2025-03-21 RX ORDER — LOSARTAN POTASSIUM 50 MG/1
50 TABLET ORAL DAILY
COMMUNITY

## 2025-03-21 RX ORDER — CHOLECALCIFEROL (VITAMIN D3) 25 MCG
1000 CAPSULE ORAL EVERY MORNING
COMMUNITY

## 2025-03-21 NOTE — PERIOP NOTE
Patient verified name, , and procedure.    Type: 1a; abbreviated assessment per anesthesia guidelines  Labs per surgeon: None  Labs per anesthesia: None      Instructed pt that they will be notified by the Gi Lab for time of arrival. If any questions please call the GI lab at [phone].    Follow diet and prep instructions per office. May have clear liquids until 2 hours prior to time of arrival.    Bath or shower the night before and the am of surgery with antibacterial soap. No lotions, oils, powders, cologne on skin. No make up, eye make up or jewelry. Wear loose fitting comfortable, clean clothing.     Must have adult present in building the entire time .     Medications for the day of procedure Atorvastatin, Lansoprazole, Metoprolol,     Hold Eliquis per Dr. Anthony's instructions.    Please hold all vitamins x 7 days prior to procedure and NSAIDS (Aspirin, Excedrin, Goody powders, Motrin, Ibuprofen, Advil, Aleve and Naproxen) x 5 days prior to procedure. Should you have a procedure date that does not allow for the amount of time instructed above, please stop taking vitamins, supplements, and NSAIDS IMMEDIATELY.       The following discharge instructions reviewed with patient: medication given during procedure may cause drowsiness for several hours, therefore, do not drive or operate machinery for remainder of the day, no alcohol on the day of your procedure, resume regular diet and activity unless otherwise directed, for mild sore throat you may use Cepacol throat lozenges or warm salt water gargles as needed, call your physician for any problems or questions. Patient verbalizes understanding.

## 2025-03-21 NOTE — PERIOP NOTE
Dear Mrs. Ruggiero     Thank you for completing your phone assessment with me today. Here are your requested procedure instructions. Please call #[phone], opt 7 or 9 with any questions/concerns.     Your procedure is scheduled at [address] [place],   GI lab nurse will call you on the business day before your procedure with your arrival time. If you have any questions on the day of procedure, please call the GI lab department [phone]     Follow diet and prep instructions per office. Nothing to eat or drink after midnight the day of your procedure.     Bath or shower the night before and the am of surgery with antibacterial soap. No lotions, oils, powders, cologne on skin. No make up, eye make up or jewelry. Wear loose fitting comfortable, clean clothing.      Must have adult present in building the entire time .      Medications for the day of procedure Atorvastatin, Lansoprazole, Metoprolol,     Hold Eliquis per Dr. Anthony's instructions.    Please hold all vitamins x 7 days prior to procedure and NSAIDS (Aspirin, Excedrin, Goody powders, Motrin, Ibuprofen, Advil, Aleve and Naproxen) x 5 days prior to procedure. Should you have a procedure date that does not allow for the amount of time instructed above, please stop taking vitamins, supplements, and NSAIDS IMMEDIATELY.      Medication given during procedure may cause drowsiness for several hours, therefore, do not drive or operate machinery for remainder of the day, no alcohol on the day of your procedure,   resume regular diet and activity unless otherwise directed, for mild sore throat you may use Cepacol throat lozenges or warm salt water gargles as needed, call your physician for any problems or questions.         Our Guide to Surgery with additional information can be found:  [email or web address]     Per patient request, the above

## 2025-03-23 ENCOUNTER — ANESTHESIA EVENT (OUTPATIENT)
Dept: SURGERY | Age: 85
End: 2025-03-23
Payer: MEDICARE

## 2025-03-24 ENCOUNTER — ANESTHESIA (OUTPATIENT)
Dept: SURGERY | Age: 85
End: 2025-03-24
Payer: MEDICARE

## 2025-03-24 ENCOUNTER — APPOINTMENT (OUTPATIENT)
Dept: GENERAL RADIOLOGY | Age: 85
End: 2025-03-24
Attending: INTERNAL MEDICINE
Payer: MEDICARE

## 2025-03-24 ENCOUNTER — HOSPITAL ENCOUNTER (OUTPATIENT)
Age: 85
Discharge: HOME | End: 2025-03-24
Attending: INTERNAL MEDICINE | Admitting: INTERNAL MEDICINE
Payer: MEDICARE

## 2025-03-24 VITALS
BODY MASS INDEX: 23.18 KG/M2 | RESPIRATION RATE: 16 BRPM | WEIGHT: 115 LBS | DIASTOLIC BLOOD PRESSURE: 75 MMHG | SYSTOLIC BLOOD PRESSURE: 165 MMHG | HEIGHT: 59 IN | HEART RATE: 85 BPM | TEMPERATURE: 97.5 F | OXYGEN SATURATION: 97 %

## 2025-03-24 PROCEDURE — C1769 GUIDE WIRE: HCPCS | Performed by: INTERNAL MEDICINE

## 2025-03-24 PROCEDURE — 74328 X-RAY BILE DUCT ENDOSCOPY: CPT | Performed by: INTERNAL MEDICINE

## 2025-03-24 PROCEDURE — 6360000002 HC RX W HCPCS: Performed by: REGISTERED NURSE

## 2025-03-24 PROCEDURE — 6370000000 HC RX 637 (ALT 250 FOR IP): Performed by: INTERNAL MEDICINE

## 2025-03-24 PROCEDURE — 2720000010 HC SURG SUPPLY STERILE: Performed by: INTERNAL MEDICINE

## 2025-03-24 PROCEDURE — C1726 CATH, BAL DIL, NON-VASCULAR: HCPCS | Performed by: INTERNAL MEDICINE

## 2025-03-24 PROCEDURE — 2709999900 HC NON-CHARGEABLE SUPPLY: Performed by: INTERNAL MEDICINE

## 2025-03-24 PROCEDURE — 3700000001 HC ADD 15 MINUTES (ANESTHESIA): Performed by: INTERNAL MEDICINE

## 2025-03-24 PROCEDURE — 3600000004 HC SURGERY LEVEL 4 BASE: Performed by: INTERNAL MEDICINE

## 2025-03-24 PROCEDURE — 3700000000 HC ANESTHESIA ATTENDED CARE: Performed by: INTERNAL MEDICINE

## 2025-03-24 PROCEDURE — 43277 ERCP EA DUCT/AMPULLA DILATE: CPT | Performed by: INTERNAL MEDICINE

## 2025-03-24 PROCEDURE — 7100000011 HC PHASE II RECOVERY - ADDTL 15 MIN: Performed by: INTERNAL MEDICINE

## 2025-03-24 PROCEDURE — 6360000004 HC RX CONTRAST MEDICATION: Performed by: INTERNAL MEDICINE

## 2025-03-24 PROCEDURE — 2580000003 HC RX 258: Performed by: ANESTHESIOLOGY

## 2025-03-24 PROCEDURE — 2500000003 HC RX 250 WO HCPCS: Performed by: REGISTERED NURSE

## 2025-03-24 PROCEDURE — 7100000000 HC PACU RECOVERY - FIRST 15 MIN: Performed by: INTERNAL MEDICINE

## 2025-03-24 PROCEDURE — 7100000010 HC PHASE II RECOVERY - FIRST 15 MIN: Performed by: INTERNAL MEDICINE

## 2025-03-24 PROCEDURE — 43264 ERCP REMOVE DUCT CALCULI: CPT | Performed by: INTERNAL MEDICINE

## 2025-03-24 PROCEDURE — 43275 ERCP REMOVE FORGN BODY DUCT: CPT | Performed by: INTERNAL MEDICINE

## 2025-03-24 PROCEDURE — 2580000003 HC RX 258: Performed by: REGISTERED NURSE

## 2025-03-24 PROCEDURE — 3600000014 HC SURGERY LEVEL 4 ADDTL 15MIN: Performed by: INTERNAL MEDICINE

## 2025-03-24 PROCEDURE — 7100000001 HC PACU RECOVERY - ADDTL 15 MIN: Performed by: INTERNAL MEDICINE

## 2025-03-24 RX ORDER — IPRATROPIUM BROMIDE AND ALBUTEROL SULFATE 2.5; .5 MG/3ML; MG/3ML
1 SOLUTION RESPIRATORY (INHALATION)
Status: DISCONTINUED | OUTPATIENT
Start: 2025-03-24 | End: 2025-03-24 | Stop reason: HOSPADM

## 2025-03-24 RX ORDER — SODIUM CHLORIDE 0.9 % (FLUSH) 0.9 %
5-40 SYRINGE (ML) INJECTION EVERY 12 HOURS SCHEDULED
Status: DISCONTINUED | OUTPATIENT
Start: 2025-03-24 | End: 2025-03-24 | Stop reason: HOSPADM

## 2025-03-24 RX ORDER — ROCURONIUM BROMIDE 10 MG/ML
INJECTION, SOLUTION INTRAVENOUS
Status: DISCONTINUED | OUTPATIENT
Start: 2025-03-24 | End: 2025-03-24 | Stop reason: SDUPTHER

## 2025-03-24 RX ORDER — SODIUM CHLORIDE, SODIUM LACTATE, POTASSIUM CHLORIDE, CALCIUM CHLORIDE 600; 310; 30; 20 MG/100ML; MG/100ML; MG/100ML; MG/100ML
INJECTION, SOLUTION INTRAVENOUS CONTINUOUS
Status: DISCONTINUED | OUTPATIENT
Start: 2025-03-24 | End: 2025-03-24 | Stop reason: HOSPADM

## 2025-03-24 RX ORDER — SUCCINYLCHOLINE/SOD CL,ISO/PF 200MG/10ML
SYRINGE (ML) INTRAVENOUS
Status: DISCONTINUED | OUTPATIENT
Start: 2025-03-24 | End: 2025-03-24 | Stop reason: SDUPTHER

## 2025-03-24 RX ORDER — ONDANSETRON 2 MG/ML
INJECTION INTRAMUSCULAR; INTRAVENOUS
Status: DISCONTINUED | OUTPATIENT
Start: 2025-03-24 | End: 2025-03-24 | Stop reason: SDUPTHER

## 2025-03-24 RX ORDER — NALOXONE HYDROCHLORIDE 0.4 MG/ML
INJECTION, SOLUTION INTRAMUSCULAR; INTRAVENOUS; SUBCUTANEOUS PRN
Status: DISCONTINUED | OUTPATIENT
Start: 2025-03-24 | End: 2025-03-24 | Stop reason: HOSPADM

## 2025-03-24 RX ORDER — LIDOCAINE HYDROCHLORIDE 10 MG/ML
1 INJECTION, SOLUTION INFILTRATION; PERINEURAL
Status: DISCONTINUED | OUTPATIENT
Start: 2025-03-24 | End: 2025-03-24 | Stop reason: HOSPADM

## 2025-03-24 RX ORDER — DEXAMETHASONE SODIUM PHOSPHATE 10 MG/ML
INJECTION, SOLUTION INTRA-ARTICULAR; INTRALESIONAL; INTRAMUSCULAR; INTRAVENOUS; SOFT TISSUE
Status: DISCONTINUED | OUTPATIENT
Start: 2025-03-24 | End: 2025-03-24 | Stop reason: SDUPTHER

## 2025-03-24 RX ORDER — IOPAMIDOL 755 MG/ML
INJECTION, SOLUTION INTRAVASCULAR PRN
Status: DISCONTINUED | OUTPATIENT
Start: 2025-03-24 | End: 2025-03-24 | Stop reason: ALTCHOICE

## 2025-03-24 RX ORDER — ONDANSETRON 2 MG/ML
4 INJECTION INTRAMUSCULAR; INTRAVENOUS
Status: DISCONTINUED | OUTPATIENT
Start: 2025-03-24 | End: 2025-03-24 | Stop reason: HOSPADM

## 2025-03-24 RX ORDER — HALOPERIDOL 5 MG/ML
1 INJECTION INTRAMUSCULAR
Status: DISCONTINUED | OUTPATIENT
Start: 2025-03-24 | End: 2025-03-24 | Stop reason: HOSPADM

## 2025-03-24 RX ORDER — SODIUM CHLORIDE 0.9 % (FLUSH) 0.9 %
5-40 SYRINGE (ML) INJECTION PRN
Status: DISCONTINUED | OUTPATIENT
Start: 2025-03-24 | End: 2025-03-24 | Stop reason: HOSPADM

## 2025-03-24 RX ORDER — INDOMETHACIN 100 MG
SUPPOSITORY, RECTAL RECTAL PRN
Status: DISCONTINUED | OUTPATIENT
Start: 2025-03-24 | End: 2025-03-24 | Stop reason: ALTCHOICE

## 2025-03-24 RX ORDER — LIDOCAINE HYDROCHLORIDE 20 MG/ML
INJECTION, SOLUTION EPIDURAL; INFILTRATION; INTRACAUDAL; PERINEURAL
Status: DISCONTINUED | OUTPATIENT
Start: 2025-03-24 | End: 2025-03-24 | Stop reason: SDUPTHER

## 2025-03-24 RX ORDER — PROPOFOL 10 MG/ML
INJECTION, EMULSION INTRAVENOUS
Status: DISCONTINUED | OUTPATIENT
Start: 2025-03-24 | End: 2025-03-24 | Stop reason: SDUPTHER

## 2025-03-24 RX ORDER — SODIUM CHLORIDE, SODIUM LACTATE, POTASSIUM CHLORIDE, CALCIUM CHLORIDE 600; 310; 30; 20 MG/100ML; MG/100ML; MG/100ML; MG/100ML
INJECTION, SOLUTION INTRAVENOUS
Status: DISCONTINUED | OUTPATIENT
Start: 2025-03-24 | End: 2025-03-24 | Stop reason: SDUPTHER

## 2025-03-24 RX ADMIN — PROPOFOL 100 MG: 10 INJECTION, EMULSION INTRAVENOUS at 10:00

## 2025-03-24 RX ADMIN — SODIUM CHLORIDE, SODIUM LACTATE, POTASSIUM CHLORIDE, AND CALCIUM CHLORIDE: 600; 310; 30; 20 INJECTION, SOLUTION INTRAVENOUS at 09:56

## 2025-03-24 RX ADMIN — DEXAMETHASONE SODIUM PHOSPHATE 10 MG: 10 INJECTION INTRAMUSCULAR; INTRAVENOUS at 10:07

## 2025-03-24 RX ADMIN — Medication 100 MG: at 10:00

## 2025-03-24 RX ADMIN — LIDOCAINE HYDROCHLORIDE 100 MG: 20 INJECTION, SOLUTION EPIDURAL; INFILTRATION; INTRACAUDAL; PERINEURAL at 10:00

## 2025-03-24 RX ADMIN — SODIUM CHLORIDE, SODIUM LACTATE, POTASSIUM CHLORIDE, AND CALCIUM CHLORIDE: .6; .31; .03; .02 INJECTION, SOLUTION INTRAVENOUS at 09:33

## 2025-03-24 RX ADMIN — ONDANSETRON 4 MG: 2 INJECTION, SOLUTION INTRAMUSCULAR; INTRAVENOUS at 10:07

## 2025-03-24 RX ADMIN — ROCURONIUM BROMIDE 5 MG: 10 INJECTION, SOLUTION INTRAVENOUS at 10:00

## 2025-03-24 ASSESSMENT — PAIN - FUNCTIONAL ASSESSMENT: PAIN_FUNCTIONAL_ASSESSMENT: 0-10

## 2025-03-24 NOTE — DISCHARGE INSTRUCTIONS
Endoscopic Retrograde Cholangiopancreatogram (ERCP): What to Expect at Home  Your Recovery  After you have an endoscopic retrograde cholangiopancreatogram (ERCP).  You will be able to go home after your doctor or a nurse checks to make sure you are not having any problems. If you stay in the hospital overnight, you may go home the next day.  You may have a sore throat for a day or two after the procedure.  This care sheet gives you a general idea about how long it will take for you to recover. But each person recovers at a different pace. Follow the steps below to get better as quickly as possible.  How can you care for yourself at home?  Activity  Rest as much as you need to after you go home.  You should be able to go back to your usual activities the day after the procedure.  Diet  Soup or broth diet for the next 24 hours - nothing creamy  Drink plenty of fluids (unless your doctor tells you not to).  Medicines  If you have a sore throat the next day, use an over-the-counter spray to numb your throat.        Follow-up care is a key part of your treatment and safety. Be sure to make and go to all appointments, and call your doctor if you are having problems. It’s also a good idea to know your test results and keep a list of the medicines you take.    When should you call for help?  Call 911 anytime you think you may need emergency care. For example, call if:  You vomit blood or what looks like coffee grounds.  You pass maroon or bloody stools.  Call your doctor now or go to the emergency room if:  You have trouble swallowing.  You have belly pain.  Your stools are black and tarlike or have streaks of blood.  You are sick to your stomach and cannot drink fluids.  You have a fever.  You have pain that does not get better after you take your pain medicine.  Watch closely for changes in your health, and be sure to contact your doctor if:  Your throat still hurts after a day or two.  You do not get better as 
Aplpe

## 2025-03-24 NOTE — H&P
Gastroenterology and Interventional Endoscopy Pre-procedure HPI    Date of visit   :  03/24/25      Patient name :  Kortney Ruggiero  YOB: 1940    HPI:    Patient is a 85 y.o. year old female, who has been referred  for ERCP with plan for stone/stent removal           ____________________      Past Medical History:  Reviewed, and in prior HPI,documentation    Surgical History:    Reviewed, and in prior HPI,documentation    Medications:    Reviewed, and in prior HPI,documentation    Allergies:  Allergies   Allergen Reactions    Amlodipine Swelling     Leg swelling     Metronidazole Rash       Social History:    Reviewed, and in prior HPI,documentation    Family History:    Reviewed, and in prior HPI,documentation  Physical Exam:    There were no vitals filed for this visit.  Body mass index is 23.23 kg/m².  [unfilled]      Constitutional: Alert, oriented.  No acute distress  Head: Normocephalic and Atraumatic  Eyes: No icterus, EOMI, no congestion  ENT: No deformity, no hearing loss   Cardiovascular: Regular rate and rhythm, S1-S2 normal, no murmur rub or gallop  Respiratory: Clear to auscultation bilaterally no wheezing rhonchi or crackles  Gastrointestinal:, No hepatosplenomegaly nontender nondistended bowel sounds present in all 4 quadrants  Musculoskeletal: No joint deformity, no swelling in larger joints including knees elbows hands shoulders  Skin: No new rash.  Neurologic: Alert oriented to time place and person , good affect  ____________________    Recommendations:  --Plan for ERCP    Karina Anthony MD  Gastroenterology and Interventional Endoscopy

## 2025-03-24 NOTE — OP NOTE
Procedure: ERCP with stent removal; sphincterotomy, stone removal and sphincteroplasty (dilation)    Indication: Patient has history of CBD stones and stent placed previously. Now presents for sphincterotomy and stone/stent removal. Previously on [facility]    Date of Procedure: 3/24/2025    Patient profile: Refer to patient note in chart for documentation of history and physical    Providers: Karina Anthony MD    Referring MD: Arron Carreno APRN - NP     PCP: Arron Carreno APRN - NP    Medicines: General Anesthesia    Complication: No immediate complications.     Estimated blood loss: Minimal    Procedure: After the risks including, but not limited to medication reaction, infection, bleeding, perforation, missed lesions, benefits and alternatives of the procedure were discussed with the patient and all questions were answered, informed consent was obtained. The duodenoscope was passed under direct vision throughout the procedure, the patient's blood pressure pulse and oxygen saturation were monitored continuously. The scope was introduced through the mouth and advanced to the second part of duodenum. The endoscopy was performed without difficulty. The patient tolerated the procedure well. The ERCP was performed with the patient in the prone position.    Findings:     films obtained prior to start the procedure was normal. The previously placed biliary stent was noted in position.    The duodenoscope  was introduced in the mouth advanced into the esophagus into the stomach, and into the level of the ampulla, where the previously placed biliary stent was noted. It should be noted that the ampulla is malpositioned in the 3rd portion of the duodenum (normal variant), however this requires significant scope manipulation into the third portion of the duodenum.    Next, a Rx 44 sphincterotome with 0.035 inch semi-stiff guidewire was introduced, and cannulation of the bile duct was attempted using the wire first

## 2025-03-24 NOTE — ANESTHESIA PRE PROCEDURE
Department of Anesthesiology  Preprocedure Note       Name:  Kortney Ruggiero   Age:  85 y.o.  :  1940                                          MRN:  [id number]         Date:  3/24/2025      Surgeon: Surgeon(s):  Karina Anthony MD    Procedure: Procedure(s):  ENDOSCOPIC RETROGRADE CHOLANGIOPANCREATOGRAPHY DIAGNOSTIC    Medications prior to admission:   Prior to Admission medications    Medication Sig Start Date End Date Taking? Authorizing Provider   vitamin D (VITAMIN D-3) 25 MCG (1000 UT) CAPS Take 1 capsule by mouth every morning   Yes Lay Santiago MD   vitamin C (ASCORBIC ACID) 500 MG tablet Take 1 tablet by mouth daily   Yes Lay Santiago MD   losartan (COZAAR) 50 MG tablet Take 1 tablet by mouth daily   Yes Lay Santiago MD   ondansetron (ZOFRAN) 4 MG tablet Take 1 tablet by mouth 3 times daily as needed for Nausea or Vomiting  Patient not taking: Reported on 3/21/2025 2/8/25   Hesham Pina DO   apixaban (ELIQUIS) 2.5 MG TABS tablet Take 1 tablet by mouth 2 times daily 25   Brii Nicholas APRN - NP   aspirin 81 MG EC tablet Take 1 tablet by mouth daily 25   Brii Nicholas APRN - NP   melatonin 3 MG TABS tablet Take 10 mg by mouth at bedtime    Lay Santiago MD   Multiple Vitamins-Minerals (WOMENS 50+ ADVANCED PO) Take 1 tablet by mouth every morning    Lay Santiago MD   calcium 600 MG TABS tablet Take 1 tablet by mouth every morning    Lay Santiago MD   Alpha-D-Galactosidase (BEANO PO) Take by mouth in the morning and at bedtime    Lay Santiago MD   atorvastatin (LIPITOR) 20 MG tablet Take 1 tablet by mouth daily 21   Automatic Reconciliation, Ar   famotidine (PEPCID) 20 MG tablet TAKE 1 TABLET BY MOUTH EVERY DAY AT BEDTIME AS NEEDED 21   Automatic Reconciliation, Ar   lansoprazole (PREVACID SOLUTAB) 30 MG disintegrating tablet Take 1 tablet by mouth every morning (before breakfast)

## 2025-03-24 NOTE — ANESTHESIA POSTPROCEDURE EVALUATION
Department of Anesthesiology  Postprocedure Note    Patient: Kortney Ruggiero  MRN: [id number]  YOB: 1940  Date of evaluation: 3/24/2025    Procedure Summary       Date: 03/24/25 Room / Location: [facility] MAIN OR 08 / [facility] MAIN OR    Anesthesia Start: 0956 Anesthesia Stop: 1048    Procedure: ENDOSCOPIC RETROGRADE CHOLANGIOPANCREATOGRAPHY SPHINCTER/STENT REMOVAL/BALLOON SWEEP/BILIARY DILATION/ (Upper GI Region) Diagnosis:       Common bile duct dilatation      (Common bile duct dilatation [K83.8])    Providers: Karina nAthony MD Responsible Provider: Brent Jean MD    Anesthesia Type: General ASA Status: 3            Anesthesia Type: General    Nohelia Phase I: Nohelia Score: 8    Nohelia Phase II:      Anesthesia Post Evaluation    Patient location during evaluation: PACU  Patient participation: complete - patient participated  Level of consciousness: awake and alert  Airway patency: patent  Nausea & Vomiting: no nausea and no vomiting  Cardiovascular status: hemodynamically stable  Respiratory status: acceptable, nonlabored ventilation and spontaneous ventilation  Hydration status: euvolemic  Comments: BP (!) 175/71   Pulse 92   Temp 98.1 °F (36.7 °C) (Temporal)   Resp 16   Ht 1.499 m (4' 11\")   Wt 52.2 kg (115 lb)   SpO2 100%   BMI 23.23 kg/m²     Multimodal analgesia pain management approach  Pain management: adequate and satisfactory to patient    No notable events documented.

## (undated) DEVICE — ELECTRODE PT RET AD L9FT HI MOIST COND ADH HYDRGEL CORDED

## (undated) DEVICE — 1200 GUARD II KIT W/5MM TUBE W/O VAC TUBE: Brand: GUARDIAN

## (undated) DEVICE — CONNECTOR TBNG OD5-7MM O2 END DISP

## (undated) DEVICE — SINGLE PORT MANIFOLD: Brand: NEPTUNE 2

## (undated) DEVICE — NEEDLE SYRINGE 18GA L1.5IN RED PLAS HUB S STL BLNT FILL W/O

## (undated) DEVICE — RETRIEVAL BALLOON CATHETER: Brand: EXTRACTOR™ PRO RX

## (undated) DEVICE — YANKAUER,BULB TIP,W/O VENT,RIGID,STERILE: Brand: MEDLINE

## (undated) DEVICE — GARMENT,MEDLINE,DVT,INT,CALF,LG, GEN2: Brand: MEDLINE

## (undated) DEVICE — RESCUE COMBO FORCEPS

## (undated) DEVICE — ENDOSCOPIC KIT 1.1+ OP4 CA DE 2 GWN AAMI LEVEL 3

## (undated) DEVICE — SPHINCTEROTOME: Brand: JAGTOME RX 39

## (undated) DEVICE — LUBE JELLY FOIL PACK 1.4 OZ: Brand: MEDLINE INDUSTRIES, INC.

## (undated) DEVICE — SYRINGE MED 10ML LUERLOCK TIP W/O SFTY DISP

## (undated) DEVICE — BILIARY BALLOON DILATATION CATHETER: Brand: CRE™ RX

## (undated) DEVICE — AIRLIFE™ OXYGEN TUBING 7 FEET (2.1 M) CRUSH RESISTANT OXYGEN TUBING, VINYL TIPPED: Brand: AIRLIFE™

## (undated) DEVICE — SYRINGE MEDICAL 3ML CLEAR PLASTIC STANDARD NON CONTROL LUERLOCK TIP DISPOSABLE

## (undated) DEVICE — SPHINCTEROTOME: Brand: JAGTOME RX 44

## (undated) DEVICE — SPHINCTEROTOME: Brand: DREAMTOME™ RX 44

## (undated) DEVICE — GAUZE,SPONGE,4"X4",12PLY,WOVEN,NS,LF: Brand: MEDLINE

## (undated) DEVICE — BLOCK BITE AD 60FR W/ VELC STRP ADDRESSES MOST PT AND

## (undated) DEVICE — KENDALL RADIOLUCENT FOAM MONITORING ELECTRODE RECTANGULAR SHAPE: Brand: KENDALL

## (undated) DEVICE — SYRINGE INFL 60ML DISP ALLIANCE II

## (undated) DEVICE — GARMENT,MEDLINE,DVT,INT,CALF,MED, GEN2: Brand: MEDLINE